# Patient Record
Sex: MALE | Race: BLACK OR AFRICAN AMERICAN | NOT HISPANIC OR LATINO | Employment: UNEMPLOYED | URBAN - METROPOLITAN AREA
[De-identification: names, ages, dates, MRNs, and addresses within clinical notes are randomized per-mention and may not be internally consistent; named-entity substitution may affect disease eponyms.]

---

## 2017-01-13 DIAGNOSIS — K21.9 GASTROESOPHAGEAL REFLUX DISEASE WITHOUT ESOPHAGITIS: Primary | ICD-10-CM

## 2017-04-05 DIAGNOSIS — M54.50 CHRONIC BILATERAL LOW BACK PAIN WITHOUT SCIATICA: ICD-10-CM

## 2017-04-05 DIAGNOSIS — G89.29 CHRONIC BILATERAL LOW BACK PAIN WITHOUT SCIATICA: ICD-10-CM

## 2017-04-05 RX ORDER — NAPROXEN 500 MG/1
500 TABLET ORAL 2 TIMES DAILY WITH MEALS
Qty: 60 TABLET | Refills: 1 | Status: SHIPPED | OUTPATIENT
Start: 2017-04-05 | End: 2017-06-12

## 2017-06-12 DIAGNOSIS — M54.50 CHRONIC BILATERAL LOW BACK PAIN WITHOUT SCIATICA: ICD-10-CM

## 2017-06-12 DIAGNOSIS — G89.29 CHRONIC BILATERAL LOW BACK PAIN WITHOUT SCIATICA: ICD-10-CM

## 2017-06-12 RX ORDER — NAPROXEN 500 MG/1
500 TABLET ORAL 2 TIMES DAILY PRN
Qty: 60 TABLET | Refills: 1 | Status: SHIPPED | OUTPATIENT
Start: 2017-06-12 | End: 2017-08-29

## 2017-07-31 DIAGNOSIS — K21.9 GASTROESOPHAGEAL REFLUX DISEASE WITHOUT ESOPHAGITIS: ICD-10-CM

## 2017-08-08 DIAGNOSIS — T63.441A BEE STING REACTION, ACCIDENTAL OR UNINTENTIONAL, INITIAL ENCOUNTER: ICD-10-CM

## 2017-08-08 RX ORDER — DIPHENHYDRAMINE HCL 25 MG
50 TABLET ORAL EVERY 6 HOURS PRN
Qty: 60 TABLET | Refills: 1 | Status: SHIPPED | OUTPATIENT
Start: 2017-08-08 | End: 2017-08-29

## 2017-08-28 DIAGNOSIS — K21.9 GASTROESOPHAGEAL REFLUX DISEASE WITHOUT ESOPHAGITIS: ICD-10-CM

## 2017-08-28 NOTE — TELEPHONE ENCOUNTER
I have denied refilling this medication.  Please contact pharmacy and patient and inform.  If wanting this medication will need an appointment to discuss.    Overdue for asthma recheck

## 2017-08-29 ENCOUNTER — OFFICE VISIT (OUTPATIENT)
Dept: FAMILY MEDICINE | Facility: CLINIC | Age: 42
End: 2017-08-29

## 2017-08-29 VITALS
OXYGEN SATURATION: 99 % | HEIGHT: 73 IN | DIASTOLIC BLOOD PRESSURE: 79 MMHG | HEART RATE: 70 BPM | BODY MASS INDEX: 41.75 KG/M2 | TEMPERATURE: 98.1 F | WEIGHT: 315 LBS | SYSTOLIC BLOOD PRESSURE: 113 MMHG

## 2017-08-29 DIAGNOSIS — G89.29 CHRONIC BILATERAL LOW BACK PAIN WITHOUT SCIATICA: ICD-10-CM

## 2017-08-29 DIAGNOSIS — M54.50 CHRONIC BILATERAL LOW BACK PAIN WITHOUT SCIATICA: ICD-10-CM

## 2017-08-29 DIAGNOSIS — K21.9 GASTROESOPHAGEAL REFLUX DISEASE WITHOUT ESOPHAGITIS: Primary | ICD-10-CM

## 2017-08-29 DIAGNOSIS — J30.2 CHRONIC SEASONAL ALLERGIC RHINITIS, UNSPECIFIED TRIGGER: ICD-10-CM

## 2017-08-29 DIAGNOSIS — M62.830 BACK MUSCLE SPASM: ICD-10-CM

## 2017-08-29 RX ORDER — DIPHENHYDRAMINE HCL 25 MG
50 TABLET ORAL
Qty: 90 TABLET | Refills: 1 | Status: SHIPPED | OUTPATIENT
Start: 2017-08-29 | End: 2020-01-06

## 2017-08-29 RX ORDER — CYCLOBENZAPRINE HCL 10 MG
5-10 TABLET ORAL 2 TIMES DAILY PRN
Qty: 30 TABLET | Refills: 0 | Status: SHIPPED | OUTPATIENT
Start: 2017-08-29 | End: 2017-10-02

## 2017-08-29 RX ORDER — NAPROXEN 500 MG/1
500 TABLET ORAL 2 TIMES DAILY PRN
Qty: 30 TABLET | Refills: 1 | Status: SHIPPED | OUTPATIENT
Start: 2017-08-29 | End: 2017-10-25

## 2017-08-29 NOTE — MR AVS SNAPSHOT
After Visit Summary   2017    Adan Rod    MRN: [id number]           Patient Information     Date Of Birth          1975        Visit Information        Provider Department      2017 4:00 PM Manuela Lindsay MD [facility]        Today's Diagnoses     Gastroesophageal reflux disease without esophagitis        Bee sting reaction, accidental or unintentional, initial encounter        Chronic bilateral low back pain without sciatica        Back muscle spasm           Follow-ups after your visit        Who to contact     Please call your clinic at [phone] to:    Ask questions about your health    Make or cancel appointments    Discuss your medicines    Learn about your test results    Speak to your doctor   If you have compliments or concerns about an experience at your clinic, or if you wish to file a complaint, please contact [facility] Patient Relations at [phone] or email us at [email or web address]         Additional Information About Your Visit        MyChart Information     [employer] is an electronic gateway that provides easy, online access to your medical records. With [employer], you can request a clinic appointment, read your test results, renew a prescription or communicate with your care team.     To sign up for [employer] visit the website at [email or web address]   You will be asked to enter the access code listed below, as well as some personal information. Please follow the directions to create your username and password.     Your access code is: [email or web address]  Expires: 2017  4:42 PM     Your access code will  in 90 days. If you need help or a new code, please contact your [facility] or call [phone] for assistance.        Care EveryWhere ID     This is your Care EveryWhere ID. This could be used by other organizations to access your [facility]  "records  [id number]        Your Vitals Were     Pulse Temperature Height Pulse Oximetry BMI (Body Mass Index)       70 98.1  F (36.7  C) (Oral) 6' 0.5\" (184.2 cm) 99% 46.98 kg/m2        Blood Pressure from Last 3 Encounters:   08/29/17 113/79   08/22/16 119/79   05/10/16 115/74    Weight from Last 3 Encounters:   08/29/17 (!) 351 lb 3.2 oz (159.3 kg)   08/22/16 (!) 357 lb 12.8 oz (162.3 kg)   05/10/16 (!) 347 lb 3.2 oz (157.5 kg)              Today, you had the following     No orders found for display         Today's Medication Changes          These changes are accurate as of: 8/29/17  4:42 PM.  If you have any questions, ask your nurse or doctor.               These medicines have changed or have updated prescriptions.        Dose/Directions    cyclobenzaprine 10 MG tablet   Commonly known as:  FLEXERIL   This may have changed:  when to take this   Used for:  Back muscle spasm        Dose:  5-10 mg   Take 0.5-1 tablets (5-10 mg) by mouth 2 times daily as needed for muscle spasms   Quantity:  30 tablet   Refills:  0       diphenhydrAMINE 25 MG tablet   Commonly known as:  BENADRYL   This may have changed:  when to take this   Used for:  Bee sting reaction, accidental or unintentional, initial encounter        Dose:  50 mg   Take 2 tablets (50 mg) by mouth nightly as needed for itching or allergies   Quantity:  90 tablet   Refills:  1       ranitidine 150 MG tablet   Commonly known as:  ZANTAC   This may have changed:  medication strength   Used for:  Gastroesophageal reflux disease without esophagitis        Dose:  150 mg   Take 1 tablet (150 mg) by mouth 2 times daily   Quantity:  120 tablet   Refills:  5            Where to get your medicines      These medications were sent to [facility]/pharmacy #[id number] - [place], [place] - [address]  [address], [place] [address]    Hours:  24-hours Phone:  [phone]     cyclobenzaprine 10 MG tablet    diphenhydrAMINE 25 MG tablet    naproxen 500 MG tablet "    ranitidine 150 MG tablet                Primary Care Provider Office Phone # Fax #    Christie Mary Durant [phone]       [facility] [address] [place] [address]        Equal Access to Services     RONALD HAYWARD : Hadii aad ku haddorothyo Soomaali, waaxda luqadaha, qaybta kaalmada adeegyada, waxdaniel waldenin sheltonn alfredo bustos lamarisolviridiana granados. So [facility] [phone].    ATENCIÓN: Si habla español, tiene a johnson disposición servicios gratuitos de asistencia lingüística. Ll[facility] [phone].    We comply with applicable federal civil rights laws and [place] laws. We do not discriminate on the basis of race, color, national origin, age, disability sex, sexual orientation or gender identity.            Thank you!     Thank you for choosing [facility]  for your care. Our goal is always to provide you with excellent care. Hearing back from our patients is one way we can continue to improve our services. Please take a few minutes to complete the written survey that you may receive in the mail after your visit with us. Thank you!             Your Updated Medication List - Protect others around you: Learn how to safely use, store and throw away your medicines at www.disposemymeds.org.          This list is accurate as of: 8/29/17  4:42 PM.  Always use your most recent med list.                   Brand Name Dispense Instructions for use Diagnosis    albuterol 108 (90 BASE) MCG/ACT Inhaler    PROAIR HFA    1 Inhaler    Inhale 2 puffs into the lungs every 4 hours as needed for shortness of breath / dyspnea 15 minutes prior to exercise.    Exercise-induced asthma       cyclobenzaprine 10 MG tablet    FLEXERIL    30 tablet    Take 0.5-1 tablets (5-10 mg) by mouth 2 times daily as needed for muscle spasms    Back muscle spasm       diphenhydrAMINE 25 MG tablet    BENADRYL    90 tablet    Take 2 tablets (50 mg) by mouth nightly as needed for itching or allergies    Bee sting reaction,  accidental or unintentional, initial encounter       EPINEPHrine 0.3 MG/0.3ML injection 2-pack    EPIPEN/ADRENACLICK/or ANY BX GENERIC EQUIV    0.6 mL    Inject 0.3 mLs (0.3 mg) into the muscle as needed for anaphylaxis    Bee sting reaction, accidental or unintentional, initial encounter       naproxen 500 MG tablet    NAPROSYN    30 tablet    Take 1 tablet (500 mg) by mouth 2 times daily as needed for moderate pain    Chronic bilateral low back pain without sciatica       ranitidine 150 MG tablet    ZANTAC    120 tablet    Take 1 tablet (150 mg) by mouth 2 times daily    Gastroesophageal reflux disease without esophagitis

## 2017-08-31 NOTE — PROGRESS NOTES
"       HPI:       Adan Rod is a 42 year old male with a hx of BITA, anxiety, depression, chronic pain, morbid obesity and exercise-induced asthma who presents for the following:    Medication refills:  -pt reports back and knee pain - using ice/heat - requests refills on flexeril (uses 4x/month when pain is severe with cramping) and naproxen (uses 3 times per week)  -requests refill of benadryl for seasonal allergy symptoms - uses 25mg QHS PRN  -requests refill of zantac 150mg BID for acid reflux and hx peptic ulcer    Anxiety, depression:  -reports mainly having issues with anxiety and anger  -states is in \"denial\" about depression  -reports being on meds in the past (including seroquel) that made him drowsy         PMHX:     Patient Active Problem List   Diagnosis     Sleep apnea     Anxiety state     Cataract     Major depression, chronic     Other chronic pain     Contact dermatitis and other eczema, due to unspecified cause     Generalized hyperhidrosis     Exercise-induced asthma     Morbid obesity (H)     Peptic ulcer     Scoliosis (and kyphoscoliosis), idiopathic     Vitamin D deficiency     Pain, flank, bilateral     Chronic low back pain     Health Care Home     Knee pain     Allergic to bees     Hypercholesterolemia     Raised intraocular pressure     Thoracic and lumbosacral neuritis     Osteoarthritis of lumbosacral spine without myelopathy     Primary open angle glaucoma of both eyes, mild stage     Deprivation amblyopia of both eyes     Current Outpatient Prescriptions   Medication Sig Dispense Refill     ranitidine (ZANTAC) 150 MG tablet Take 1 tablet (150 mg) by mouth 2 times daily 120 tablet 5     diphenhydrAMINE (BENADRYL) 25 MG tablet Take 2 tablets (50 mg) by mouth nightly as needed for itching or allergies 90 tablet 1     naproxen (NAPROSYN) 500 MG tablet Take 1 tablet (500 mg) by mouth 2 times daily as needed for moderate pain 30 tablet 1     cyclobenzaprine (FLEXERIL) 10 MG tablet Take " "0.5-1 tablets (5-10 mg) by mouth 2 times daily as needed for muscle spasms 30 tablet 0     EPINEPHrine (EPIPEN) 0.3 MG/0.3ML injection Inject 0.3 mLs (0.3 mg) into the muscle as needed for anaphylaxis 0.6 mL 3     albuterol (ALBUTEROL) 108 (90 BASE) MCG/ACT inhaler Inhale 2 puffs into the lungs every 4 hours as needed for shortness of breath / dyspnea 15 minutes prior to exercise. 1 Inhaler 3      Allergies   Allergen Reactions     Penicillins Difficulty breathing     And hives     Bee Venom      Ibuprofen Sodium      Prednisone           Review of Systems:   ROS negative except as noted above          Physical Exam:     Vitals:    08/29/17 1616   BP: 113/79   Pulse: 70   Temp: 98.1  F (36.7  C)   TempSrc: Oral   SpO2: 99%   Weight: (!) 351 lb 3.2 oz (159.3 kg)   Height: 6' 0.5\" (184.2 cm)     Body mass index is 46.98 kg/(m^2).    General appearance: alert, NAD  HEENT: atraumatic, normocephalic, no scleral icterus or injection, moist mucous membranes  Neck: normal ROM  CV: RRR, no murmurs/rubs/gallops, normal S1 and S2  Lungs: CTAB, no wheezes or crackles, breathing comfortably on room air  Neuro: alert, oriented x3, CNs grossly intact, no focal deficits appreciated, gait normal  Psych: normal mood/affect/behavior, answering questions appropriately, linear thought process    Assessment and Plan     Gastroesophageal reflux disease without esophagitis: will refill zantac for reflux control especially in the setting of hx peptic ulcer.  - ranitidine (ZANTAC) 150 MG tablet; Take 1 tablet (150 mg) by mouth 2 times daily  Dispense: 120 tablet; Refill: 5    Allergies: reports benadryl has been helpful in controlling his seasonal allergy symptoms in the past. States he has tried other meds in the past but feels that benadryl has been the most effective with least amount of side effects.  - diphenhydrAMINE (BENADRYL) 25 MG tablet; Take 2 tablets (50 mg) by mouth nightly as needed for itching or allergies  Dispense: 90 " tablet; Refill: 1    Chronic bilateral low back pain without sciatica with spasm: given pt's limited use of these medications, feel comfortable providing refills today.   - naproxen (NAPROSYN) 500 MG tablet; Take 1 tablet (500 mg) by mouth 2 times daily as needed for moderate pain  Dispense: 30 tablet; Refill: 1  - cyclobenzaprine (FLEXERIL) 10 MG tablet; Take 0.5-1 tablets (5-10 mg) by mouth 2 times daily as needed for muscle spasms  Dispense: 30 tablet; Refill: 0    Options for treatment and follow-up care were reviewed with the patient and/or guardian. Adan Rod and/or guardian engaged in the decision making process and verbalized understanding of the options discussed and agreed with the final plan.    Manuela Lindsay MD      Precepted today with: Devin Agudelo MD

## 2017-09-01 NOTE — PROGRESS NOTES
Preceptor Attestation:  Patient's case reviewed and discussed with Manuela Lindsay MD resident and I evaluated the patient. I agree with written assessment and plan of care.  Supervising Physician:Devin Agudelo MD    [facility]

## 2017-10-02 DIAGNOSIS — M62.830 BACK MUSCLE SPASM: ICD-10-CM

## 2017-10-02 RX ORDER — CYCLOBENZAPRINE HCL 10 MG
5-10 TABLET ORAL 2 TIMES DAILY PRN
Qty: 30 TABLET | Refills: 0 | Status: SHIPPED | OUTPATIENT
Start: 2017-10-02 | End: 2017-10-25

## 2017-10-25 DIAGNOSIS — M62.830 BACK MUSCLE SPASM: ICD-10-CM

## 2017-10-25 DIAGNOSIS — M54.50 CHRONIC BILATERAL LOW BACK PAIN WITHOUT SCIATICA: ICD-10-CM

## 2017-10-25 DIAGNOSIS — G89.29 CHRONIC BILATERAL LOW BACK PAIN WITHOUT SCIATICA: ICD-10-CM

## 2017-10-26 RX ORDER — CYCLOBENZAPRINE HCL 10 MG
5-10 TABLET ORAL 2 TIMES DAILY PRN
Qty: 30 TABLET | Refills: 0 | Status: SHIPPED | OUTPATIENT
Start: 2017-10-26 | End: 2018-12-12

## 2017-10-26 RX ORDER — NAPROXEN 500 MG/1
500 TABLET ORAL 2 TIMES DAILY PRN
Qty: 30 TABLET | Refills: 0 | Status: SHIPPED | OUTPATIENT
Start: 2017-10-26 | End: 2018-12-12

## 2017-12-19 DIAGNOSIS — J45.990 EXERCISE-INDUCED ASTHMA: ICD-10-CM

## 2017-12-19 RX ORDER — ALBUTEROL SULFATE 90 UG/1
2 AEROSOL, METERED RESPIRATORY (INHALATION) EVERY 4 HOURS PRN
Qty: 1 INHALER | Refills: 11 | Status: SHIPPED | OUTPATIENT
Start: 2017-12-19 | End: 2018-07-17

## 2017-12-19 RX ORDER — ALBUTEROL SULFATE 90 UG/1
2 AEROSOL, METERED RESPIRATORY (INHALATION) EVERY 4 HOURS PRN
Qty: 1 INHALER | Refills: 11 | Status: SHIPPED | OUTPATIENT
Start: 2017-12-19 | End: 2017-12-19

## 2018-05-17 ENCOUNTER — TELEPHONE (OUTPATIENT)
Dept: FAMILY MEDICINE | Facility: CLINIC | Age: 43
End: 2018-05-17

## 2018-07-17 ENCOUNTER — TELEPHONE (OUTPATIENT)
Dept: FAMILY MEDICINE | Facility: CLINIC | Age: 43
End: 2018-07-17

## 2018-07-17 DIAGNOSIS — J45.990 EXERCISE-INDUCED ASTHMA: ICD-10-CM

## 2018-07-17 RX ORDER — ALBUTEROL SULFATE 90 UG/1
2 AEROSOL, METERED RESPIRATORY (INHALATION) EVERY 4 HOURS PRN
Qty: 1 INHALER | Refills: 3 | Status: SHIPPED | OUTPATIENT
Start: 2018-07-17 | End: 2019-08-21

## 2018-07-17 NOTE — TELEPHONE ENCOUNTER
Refill of albuterol filled and electronically sent to his pharmacy listed. If he has increasing shortness of breath, chest tightness, fevers/chills, or is not improving he should come into clinic to be seen.    Christie Durant MD (PGY3)  Pager: [phone]  [facility] Resident

## 2018-07-17 NOTE — TELEPHONE ENCOUNTER
Called patient and updated to be seen in clinic if symptoms worsen, don't resolve, or has chest tightness/pain, fevers/chills. Patient verbalized understanding.Otilia REINA

## 2018-07-17 NOTE — TELEPHONE ENCOUNTER
[facility] phone call message- medication clarification/question:    Full Medication Name:albuterol (PROAIR HFA) 108 (90 BASE) MCG/ACT Inhaler     Question: Pt states he needs refill on inhaler. States he believes that may be the reason he is having some difficulty breathing at times. Would like sent today if possible and a call back to know if it was sent.      Pharmacy confirmed as    [id number] - [place], [place] - [address]: Yes    OK to leave a message on voice mail? Yes    Primary language: English      needed? No    Call taken on July 17, 2018 at 10:16 AM by Sola Mcnulty

## 2018-07-18 ENCOUNTER — OFFICE VISIT (OUTPATIENT)
Dept: FAMILY MEDICINE | Facility: CLINIC | Age: 43
End: 2018-07-18
Payer: MEDICARE

## 2018-07-18 ENCOUNTER — RECORDS - HEALTHEAST (OUTPATIENT)
Dept: ADMINISTRATIVE | Facility: OTHER | Age: 43
End: 2018-07-18

## 2018-07-18 VITALS
TEMPERATURE: 97.9 F | DIASTOLIC BLOOD PRESSURE: 76 MMHG | WEIGHT: 315 LBS | BODY MASS INDEX: 45.75 KG/M2 | SYSTOLIC BLOOD PRESSURE: 113 MMHG | OXYGEN SATURATION: 98 % | HEART RATE: 59 BPM

## 2018-07-18 DIAGNOSIS — F41.1 ANXIETY STATE: ICD-10-CM

## 2018-07-18 DIAGNOSIS — R06.02 SOB (SHORTNESS OF BREATH): ICD-10-CM

## 2018-07-18 DIAGNOSIS — R07.89 DISCOMFORT IN CHEST: ICD-10-CM

## 2018-07-18 DIAGNOSIS — J45.990 EXERCISE-INDUCED ASTHMA: Primary | ICD-10-CM

## 2018-07-18 RX ORDER — BRIMONIDINE TARTRATE 2 MG/ML
1 SOLUTION/ DROPS OPHTHALMIC
COMMUNITY
Start: 2017-01-20

## 2018-07-18 RX ORDER — NITROGLYCERIN 400 UG/1
SPRAY ORAL
Qty: 4.9 G | Refills: 1 | Status: SHIPPED | OUTPATIENT
Start: 2018-07-18 | End: 2018-07-19

## 2018-07-18 NOTE — PROGRESS NOTES
Assessment and Plan     1. Exercise-induced asthma  3. SOB (shortness of breath)  Will need spirometry at next visit. Patient scored 14 on ACT - could benefit from inhaled corticosteroid. Possible symptoms are cardiac in origin - less likely given negative CXR and EKG as below but will have patient complete stress test to rule this out.   - Stress test scheduled   - Asthma Action Plan (AAP) done this visit - will continue with albuterol only but may need steroid next visit  - XR CHEST 2 VW - no acute process    2. Discomfort in chest  Symptoms seem consistent with anginal pain. Discussed not over exertion until gets stress test scheduled and cardiac work up negative. Did provide SL Nitro if symptoms persist after using albuterol. No Hx of DM, smoking. Cannot calculate ASCVD as no recent lipids on file. Did discuss what symptoms warrant emergency room visit.   - EKG 12-lead complete w/read - Clinics - no acute change per my read, see interpretation below  - XR CHEST 2 VW  - ASA 81mg  - SL Nitroglycerin prescribed and instructions discussed      Options for treatment and follow-up care were reviewed with the patient and/or guardian. Adan Rod and/or guardian engaged in the decision making process and verbalized understanding of the options discussed and agreed with the final plan.    Praful Ahn MD   [facility]  Pager #: [phone]    Precepted today with: Dr. Westfall           HPI:       Adan Rod is a 43 year old male with PMH of asthma who presents for:    SOB/chest discomfort:  Happens with exercise - walking up stairs, lifting heavy  Started two months ago  Does not know if it feels like his asthma   This is a big change for him - previously able to walk without difficulty  Does get diaphoretic with episodes  Is now happening every day - getting progressively worse  Substernal pain - localized  No numbness or radiating pain  Cannot give pain a number but does not feel  "stabbing \"its just hard to catch breath\"  Pain goes away after 3-4 minutes  Does take his inhaler which helps symptoms  Cannot tell if the weather is a factor  Not currently having the pain    Did have stress test in 2008, unsure why he had this done but it was normal.     Does smoke THC for glaucoma - does not seem to exacerbate symptoms.     Father had heart attack at 70, no other early cardiac history in family.            PMHX:     Patient Active Problem List   Diagnosis     Sleep apnea     Anxiety state     Cataract     Major depression, chronic     Other chronic pain     Contact dermatitis and other eczema, due to unspecified cause     Generalized hyperhidrosis     Exercise-induced asthma     Morbid obesity (H)     Peptic ulcer     Scoliosis (and kyphoscoliosis), idiopathic     Vitamin D deficiency     Pain, flank, bilateral     Chronic low back pain     Health Care Home     Knee pain     Allergic to bees     Hypercholesterolemia     Raised intraocular pressure     Thoracic and lumbosacral neuritis     Osteoarthritis of lumbosacral spine without myelopathy     Primary open angle glaucoma of both eyes, mild stage     Deprivation amblyopia of both eyes       Current Outpatient Prescriptions   Medication Sig Dispense Refill     albuterol (PROAIR HFA) 108 (90 Base) MCG/ACT Inhaler Inhale 2 puffs into the lungs every 4 hours as needed for shortness of breath / dyspnea 15 minutes prior to exercise. 1 Inhaler 3     cyclobenzaprine (FLEXERIL) 10 MG tablet Take 0.5-1 tablets (5-10 mg) by mouth 2 times daily as needed for muscle spasms 30 tablet 0     diphenhydrAMINE (BENADRYL) 25 MG tablet Take 2 tablets (50 mg) by mouth nightly as needed for itching or allergies 90 tablet 1     EPINEPHrine (EPIPEN) 0.3 MG/0.3ML injection Inject 0.3 mLs (0.3 mg) into the muscle as needed for anaphylaxis 0.6 mL 3     naproxen (NAPROSYN) 500 MG tablet Take 1 tablet (500 mg) by mouth 2 times daily as needed for moderate pain 30 tablet 0 "     ranitidine (ZANTAC) 150 MG tablet Take 1 tablet (150 mg) by mouth 2 times daily 120 tablet 5       Social History     Social History     Marital status: Single     Spouse name: N/A     Number of children: N/A     Years of education: N/A     Occupational History     Not on file.     Social History Main Topics     Smoking status: Former Smoker     Smokeless tobacco: Never Used     Alcohol use No     Drug use: No     Sexual activity: Not on file     Other Topics Concern     Not on file     Social History Narrative          Allergies   Allergen Reactions     Penicillins Difficulty breathing     And hives     Bee Venom      Ibuprofen Sodium      Percocet [Oxycodone-Acetaminophen]        No results found for this or any previous visit (from the past 24 hour(s)).         Review of Systems:   C: NEGATIVE for fatigue, unexpected change in weight  E: NEGATIVE for acute vision problems or changes  R: NEGATIVE for significant cough or shortness of breath  CV: POSITIVE for chest pain, No palpitations or new or worsening peripheral edema  P: NEGATIVE for changes in mood or affect    Complete ROS negative unless noted above or in HPI          Physical Exam:     Vitals:    07/18/18 0855   BP: 113/76   Pulse: 59   Temp: 97.9  F (36.6  C)   TempSrc: Oral   SpO2: 98%   Weight: (!) 342 lb (155.1 kg)     Body mass index is 45.75 kg/(m^2).    GENERAL APPEARANCE: alert and no acute distress, obese male  PSYCH: mentation appears normal and affect normal/bright  EYES: EOMI, no scleral icterus  RESP: lungs clear to auscultation - no rales, rhonchi or wheezes  CV: regular rate and rhythm, normal S1 S2, no S3 or S4 and no murmur, click or rub -  CHEST: unable to reproduce pain with palpation  ABDOMEN:  soft, nontender, no HSM or masses and bowel sounds normal. No abdominal bruits.  EXT: no cyanosis or edema in lower extremities  SKIN: no venous stasis changes  NEURO: Normal strength and tone, sensory exam grossly normal, mentation intact  and speech normal         EKG Interpretation:      Interpreted by Praful Ahn  Time reviewed: 0931   Symptoms at time of EKG: None   Rhythm: Normal sinus   Rate: Normal  Axis: Normal  Ectopy: None  Conduction: Normal  ST Segments/ T Waves: No ST-T wave changes and No acute ischemic changes  Q Waves: None  Comparison to prior: No old EKG available    Clinical Impression: normal EKG

## 2018-07-18 NOTE — PATIENT INSTRUCTIONS
My Asthma Action Plan  Name: Adan Rod  YOB: 1975  Date: 7/18/2018   My doctor: Christie Durant   My clinic:   [facility] [address] [place] [address]  [phone]    My Asthma Severity: intermittent Avoid your asthma triggers: exercise or sports      GREEN ZONE   Good Control    I feel good    No cough or wheeze    Can work, sleep and play without asthma symptoms       Take your asthma control medicine every day.  Take the medications listed below daily.    Albuterol as needed - we may need to add a daily inhaled steroid     1. If exercise triggers your asthma, take your rescue medication (2 puffs of albuterol, Ventolin/Pro-Air) 15 minutes before exercise or sports, and during exercise if you have asthma symptoms.  2. Spacer to use with inhaler: If you have a spacer, make sure to use it with your inhaler.              YELLOW ZONE Getting Worse  I have ANY of these:    I do not feel good    Cough or wheeze    Chest feels tight    Wake up at night   1. Keep taking your Green Zone medications.  2. Start taking your rescue medicine (1-2 puffs of albuterol - Ventolin/Pro-Air) every 4-6 hours as needed.  3. If symptoms are not controlled with above, can take 2 puffs every 20 minutes for up to 1 hour, then continue every 4 hours if needed.   4. If you do not return to the Green Zone in 12-24 hours or you get worse, call the clinic.         RED ZONE Medical Alert - Get Help  I have ANY of these:    I feel awful    Medicine is not helping    Breathing getting harder    Trouble walking or talking    Nose opens wide to breathe       1. Take your rescue medicine NOW (6-8 puffs of albuterol - Ventolin/Pro-Air) for every 20 minutes for up to 1 hour.  2. If your provider has prescribed an oral steroid medicine (Prednisone 20 mg), start taking it NOW.  3. Call your doctor NOW.  4. If you are still in the Red Zone after 20 minutes and you have not reached your doctor:    Take  your rescue medicine again (6-8 puffs of albuterol - Ventolin/Pro-Air) and    Call 911 or go to the emergency room right away    See your regular doctor within 1 weeks of an Emergency Room or Urgent Care visit for follow-up treatment.        This Asthma Action Plan provides authorization for the administration of medication described in the AAP.  YES      Electronically signed by: Praful Ahn    Annual Reminders:  Meet with Asthma Educator,  Flu Shot in the Fall, Pneumonia Shot  Pharmacy:    [id number] - [place], [place] - [address]  [id number] [place], [place] - [address] [facility]      Referral for ( TEST )  :      Nuclear Med with Lexiscan  LOCATION/PLACE/Provider :    [facility]   DATE & TIME :     8-1-2018 at 9:15am and 8-2-2018 at 7:00am  PHONE :     [phone]  FAX :     [phone]  Appointment made by clinic staff/:    Jade

## 2018-07-18 NOTE — NURSING NOTE
Chief Complaint   Patient presents with     Asthma     check up. Has been having more difficulties with breathing for the past 2 months. Currently only taking Albuterol  as needed.      Medication Reconciliation     completed.        /76  Pulse 59  Temp 97.9  F (36.6  C) (Oral)  Wt (!) 342 lb (155.1 kg)  SpO2 98%  BMI 45.75 kg/m2

## 2018-07-18 NOTE — MR AVS SNAPSHOT
After Visit Summary   7/18/2018    Adan Rod    MRN: [id number]           Patient Information     Date Of Birth          1975        Visit Information        Provider Department      7/18/2018 9:00 AM Praful Ahn MD [facility]        Today's Diagnoses     Exercise-induced asthma    -  1    Discomfort in chest        SOB (shortness of breath)        Anxiety state          Care Instructions      My Asthma Action Plan  Name: Adan Rod  YOB: 1975  Date: 7/18/2018   My doctor: Christie Durant   My clinic:   [facility] [address] [place] [address]  [phone]    My Asthma Severity: intermittent Avoid your asthma triggers: exercise or sports      GREEN ZONE   Good Control    I feel good    No cough or wheeze    Can work, sleep and play without asthma symptoms       Take your asthma control medicine every day.  Take the medications listed below daily.    Albuterol as needed - we may need to add a daily inhaled steroid     1. If exercise triggers your asthma, take your rescue medication (2 puffs of albuterol, Ventolin/Pro-Air) 15 minutes before exercise or sports, and during exercise if you have asthma symptoms.  2. Spacer to use with inhaler: If you have a spacer, make sure to use it with your inhaler.              YELLOW ZONE Getting Worse  I have ANY of these:    I do not feel good    Cough or wheeze    Chest feels tight    Wake up at night   1. Keep taking your Green Zone medications.  2. Start taking your rescue medicine (1-2 puffs of albuterol - Ventolin/Pro-Air) every 4-6 hours as needed.  3. If symptoms are not controlled with above, can take 2 puffs every 20 minutes for up to 1 hour, then continue every 4 hours if needed.   4. If you do not return to the Green Zone in 12-24 hours or you get worse, call the clinic.         RED ZONE Medical Alert - Get Help  I have ANY of these:    I feel awful    Medicine is not  helping    Breathing getting harder    Trouble walking or talking    Nose opens wide to breathe       1. Take your rescue medicine NOW (6-8 puffs of albuterol - Ventolin/Pro-Air) for every 20 minutes for up to 1 hour.  2. If your provider has prescribed an oral steroid medicine (Prednisone 20 mg), start taking it NOW.  3. Call your doctor NOW.  4. If you are still in the Red Zone after 20 minutes and you have not reached your doctor:    Take your rescue medicine again (6-8 puffs of albuterol - Ventolin/Pro-Air) and    Call 911 or go to the emergency room right away    See your regular doctor within 1 weeks of an Emergency Room or Urgent Care visit for follow-up treatment.        This Asthma Action Plan provides authorization for the administration of medication described in the AAP.  YES      Electronically signed by: Praful Ahn    Annual Reminders:  Meet with Asthma Educator,  Flu Shot in the Fall, Pneumonia Shot  Pharmacy:    [id number] - [place], [place] - [address]   [id number] [address] [facility]      Referral for ( TEST )  :      Nuclear Med with Lexiscan  LOCATION/PLACE/Provider :    [facility]   DATE & TIME :     8-1-2018 at 9:15am and 8-2-2018 at 7:00am  PHONE :     [phone]  FAX :     [phone]  Appointment made by clinic staff/:    Jade            Follow-ups after your visit        Who to contact     Please call your clinic at [phone] to:    Ask questions about your health    Make or cancel appointments    Discuss your medicines    Learn about your test results    Speak to your doctor            Additional Information About Your Visit        [employer] Information     [employer] is an electronic gateway that provides easy, online access to your medical records. With [employer], you can request a clinic appointment, read your test results, renew a prescription or communicate with your care team.     To sign up for [employer] visit the  website at [email or web address]   You will be asked to enter the access code listed below, as well as some personal information. Please follow the directions to create your username and password.     Your access code is: [email or web address]  Expires: 10/23/2018  9:14 AM     Your access code will  in 90 days. If you need help or a new code, please contact your [facility] or call [phone] for assistance.        Care EveryWhere ID     This is your Care EveryWhere ID. This could be used by other organizations to access your [facility] medical records  [id number]        Your Vitals Were     Pulse Temperature Pulse Oximetry BMI (Body Mass Index)          59 97.9  F (36.6  C) (Oral) 98% 45.75 kg/m2         Blood Pressure from Last 3 Encounters:   18 113/76   17 113/79   16 119/79    Weight from Last 3 Encounters:   18 (!) 342 lb (155.1 kg)   17 (!) 351 lb 3.2 oz (159.3 kg)   16 (!) 357 lb 12.8 oz (162.3 kg)              We Performed the Following     Asthma Action Plan (AAP)     EKG 12-lead complete w/read - Clinics     Nuclear Med with Lexiscan (Stress Test)     XR CHEST 2 VW          Today's Medication Changes          These changes are accurate as of 18 11:59 PM.  If you have any questions, ask your nurse or doctor.               Start taking these medicines.        Dose/Directions    nitroGLYcerin 0.4 MG/SPRAY spray   Commonly known as:  NITROLINGUAL   Used for:  Discomfort in chest   Started by:  Praful Ahn MD        For chest pain spray 1 spray under tongue every 5 minutes for 3 doses. If symptoms persist 5 minutes after 1st dose call 911.   Quantity:  4.9 g   Refills:  1            Where to get your medicines      These medications were sent to [id number] - [place], [place] - [address] [facility] [facility]  [address], [place] [address]     Phone:  [phone]     nitroGLYcerin 0.4 MG/SPRAY spray                Primary  Care Provider Office Phone # Fax #    Christie Durant [phone]       [facility] [address] [place] [address]        Equal Access to Services     RONALD HAYWARD : Hadii nancy ku haddorothyo Soomaali, waaxda luqadaha, qaybta kaalmada adeegyada, zoya cazaresmireille granados. So [facility] [phone].    ATENCIÓN: Si habla español, tiene a johnson disposición servicios gratuitos de asistencia lingüística. Llame al [phone].    We comply with applicable federal civil rights laws and [place] laws. We do not discriminate on the basis of race, color, national origin, age, disability, sex, sexual orientation, or gender identity.            Thank you!     Thank you for choosing [facility]  for your care. Our goal is always to provide you with excellent care. Hearing back from our patients is one way we can continue to improve our services. Please take a few minutes to complete the written survey that you may receive in the mail after your visit with us. Thank you!             Your Updated Medication List - Protect others around you: Learn how to safely use, store and throw away your medicines at www.disposemymeds.org.          This list is accurate as of 7/18/18 11:59 PM.  Always use your most recent med list.                   Brand Name Dispense Instructions for use Diagnosis    albuterol 108 (90 Base) MCG/ACT Inhaler    PROAIR HFA    1 Inhaler    Inhale 2 puffs into the lungs every 4 hours as needed for shortness of breath / dyspnea 15 minutes prior to exercise.    Exercise-induced asthma       brimonidine 0.2 % ophthalmic solution    ALPHAGAN     1 drop        cyclobenzaprine 10 MG tablet    FLEXERIL    30 tablet    Take 0.5-1 tablets (5-10 mg) by mouth 2 times daily as needed for muscle spasms    Back muscle spasm       diphenhydrAMINE 25 MG tablet    BENADRYL    90 tablet    Take 2 tablets (50 mg) by mouth nightly as needed for itching or allergies    Chronic  seasonal allergic rhinitis, unspecified trigger       EPINEPHrine 0.3 MG/0.3ML injection 2-pack    EPIPEN/ADRENACLICK/or ANY BX GENERIC EQUIV    0.6 mL    Inject 0.3 mLs (0.3 mg) into the muscle as needed for anaphylaxis    Bee sting reaction, accidental or unintentional, initial encounter       naproxen 500 MG tablet    NAPROSYN    30 tablet    Take 1 tablet (500 mg) by mouth 2 times daily as needed for moderate pain    Chronic bilateral low back pain without sciatica       nitroGLYcerin 0.4 MG/SPRAY spray    NITROLINGUAL    4.9 g    For chest pain spray 1 spray under tongue every 5 minutes for 3 doses. If symptoms persist 5 minutes after 1st dose call 911.    Discomfort in chest       ranitidine 150 MG tablet    ZANTAC    120 tablet    Take 1 tablet (150 mg) by mouth 2 times daily    Gastroesophageal reflux disease without esophagitis

## 2018-07-19 ENCOUNTER — TELEPHONE (OUTPATIENT)
Dept: FAMILY MEDICINE | Facility: CLINIC | Age: 43
End: 2018-07-19

## 2018-07-19 DIAGNOSIS — R07.89 DISCOMFORT IN CHEST: Primary | ICD-10-CM

## 2018-07-19 RX ORDER — NITROGLYCERIN 0.4 MG/1
TABLET SUBLINGUAL
Qty: 25 TABLET | Refills: 0 | Status: SHIPPED | OUTPATIENT
Start: 2018-07-19 | End: 2018-12-12

## 2018-07-19 ASSESSMENT — ASTHMA QUESTIONNAIRES: ACT_TOTALSCORE: 14

## 2018-07-19 NOTE — TELEPHONE ENCOUNTER
[facility] phone call message- medication clarification/question:    Full Medication Name: nitroGLYcerin (NITROLINGUAL) 0.4 MG/SPRAY spray    Question: Pt called in stating insurance won't cover this medication and needs a prior authorization unless doctor has an alternative they would like to send instead. Pt would like a call back to let him know what is going to be done.     Pharmacy confirmed as      [id number] - [place], [place] - [address]: Yes    OK to leave a message on voice mail? Yes    Primary language: English      needed? No    Call taken on July 19, 2018 at 10:03 AM by Sola Mcnulty

## 2018-07-24 NOTE — TELEPHONE ENCOUNTER
Patient is calling regarding his spray he states he called a couple days ago and is not sure about what is going on with the Rx. Told him it is in prior auth process, but he said he has no idea. Patient also mention about aspirin. Told him it could take up to two business days for a response. Please call and advise.

## 2018-07-25 NOTE — PROGRESS NOTES
Preceptor Attestation:   Patient seen, evaluated and discussed with the resident. I personally viewed the EKG and agree with the interpretation documented by the resident.  I personally reviewed the CXR and agree with residents interpretation as documented.  I have verified the content of the note, which accurately reflects my assessment of the patient and the plan of care.  Supervising Physician:Arian Westfall MD, MD  [facility]

## 2018-08-01 ENCOUNTER — HOSPITAL ENCOUNTER (OUTPATIENT)
Dept: CARDIOLOGY | Facility: HOSPITAL | Age: 43
Discharge: HOME | End: 2018-08-01
Attending: FAMILY MEDICINE

## 2018-08-01 DIAGNOSIS — R06.02 SOB (SHORTNESS OF BREATH): ICD-10-CM

## 2018-08-01 DIAGNOSIS — R07.89 DISCOMFORT IN CHEST: ICD-10-CM

## 2018-08-14 DIAGNOSIS — G89.29 CHRONIC BILATERAL LOW BACK PAIN WITHOUT SCIATICA: ICD-10-CM

## 2018-08-14 DIAGNOSIS — M54.50 CHRONIC BILATERAL LOW BACK PAIN WITHOUT SCIATICA: ICD-10-CM

## 2018-08-15 RX ORDER — NAPROXEN 500 MG/1
500 TABLET ORAL 2 TIMES DAILY PRN
Qty: 30 TABLET | OUTPATIENT
Start: 2018-08-15

## 2018-11-26 ENCOUNTER — TELEPHONE (OUTPATIENT)
Dept: FAMILY MEDICINE | Facility: CLINIC | Age: 43
End: 2018-11-26

## 2018-11-26 NOTE — TELEPHONE ENCOUNTER
"Called patient and went over new ACT. Patient also had a concern from previous issue with side pain and SOB when moving around a lot. He stated Dr Ahn was suppose to get back to him or [facility] because he didn't want to receive the contrast injection they do during the test and they said they would get back to him and he has never heard back from anyone and is still having symptoms.     He wants to discuss other options then with Dr Durant as the contrast injections \"put him down' and he does not want that. Made appointment with dr Durant on 11/30/18 at 11am.      "

## 2018-11-27 ASSESSMENT — ASTHMA QUESTIONNAIRES: ACT_TOTALSCORE: 21

## 2018-12-11 DIAGNOSIS — K21.9 GASTROESOPHAGEAL REFLUX DISEASE WITHOUT ESOPHAGITIS: ICD-10-CM

## 2018-12-12 ENCOUNTER — OFFICE VISIT (OUTPATIENT)
Dept: FAMILY MEDICINE | Facility: CLINIC | Age: 43
End: 2018-12-12
Payer: MEDICARE

## 2018-12-12 VITALS
HEART RATE: 76 BPM | RESPIRATION RATE: 22 BRPM | WEIGHT: 315 LBS | TEMPERATURE: 97.9 F | BODY MASS INDEX: 40.43 KG/M2 | SYSTOLIC BLOOD PRESSURE: 128 MMHG | OXYGEN SATURATION: 96 % | HEIGHT: 74 IN | DIASTOLIC BLOOD PRESSURE: 85 MMHG

## 2018-12-12 DIAGNOSIS — F41.1 ANXIETY STATE: ICD-10-CM

## 2018-12-12 DIAGNOSIS — M54.50 CHRONIC BILATERAL LOW BACK PAIN WITHOUT SCIATICA: ICD-10-CM

## 2018-12-12 DIAGNOSIS — K21.9 GASTROESOPHAGEAL REFLUX DISEASE WITHOUT ESOPHAGITIS: ICD-10-CM

## 2018-12-12 DIAGNOSIS — F32.9 MAJOR DEPRESSION, CHRONIC: Primary | ICD-10-CM

## 2018-12-12 DIAGNOSIS — G89.29 CHRONIC BILATERAL LOW BACK PAIN WITHOUT SCIATICA: ICD-10-CM

## 2018-12-12 RX ORDER — NAPROXEN 500 MG/1
500 TABLET ORAL 2 TIMES DAILY PRN
Qty: 90 TABLET | Refills: 1 | Status: SHIPPED | OUTPATIENT
Start: 2018-12-12 | End: 2019-12-27

## 2018-12-12 RX ORDER — HYDROXYZINE HYDROCHLORIDE 25 MG/1
25-50 TABLET, FILM COATED ORAL EVERY 6 HOURS PRN
Qty: 60 TABLET | Refills: 1 | Status: SHIPPED | OUTPATIENT
Start: 2018-12-12 | End: 2018-12-22

## 2018-12-12 ASSESSMENT — MIFFLIN-ST. JEOR: SCORE: 2545.97

## 2018-12-12 NOTE — PROGRESS NOTES
"       HPI       Adan Rod is a 43 year old male who presents for:  Chief Complaint   Patient presents with     RECHECK     side pain, SOB       Acid reflux  - is taking ranitidine 150mg bid which is helpful and alleviates his symptoms  - he is out of the medication and would like a refill  - no melena or hematemesis  - has hx of PUD after taking a very large amount of ibupfrofen    Low energy  - has been treated in the past, no 2-3 different medications  - one made him tired (slept all the time on venlafaxine, one made him angry)  - quetiapine really affected him as well  - has also tried zoloft with no improvement  - has had therapy in the past, including anger management (got kicked out)  - no inpatient or outpatient treatment  - he doesn't feel depressed, but he has been having low energy and not wanting to socialize like normal.  - he currently feels like he is in a down-spin and can't stop the cycle.  - recently moved back home with his mother and they are butting heads.  - he is currently trying to get his own housing  - went to school for   - had stopped drinking for 10 years, but has now started up again  - his mind won't shut down at night  - feels tired during the day  - no hx of suicidal attempts  - no hallucinations or delusions  - PHQ-9 of 15 today  PHQ-9 SCORE 4/23/2013 8/27/2013 12/12/2013   PHQ-9 Total Score 21 21 15     ROS: Complete 6 point ROS completed and negative other than stated above.         Physical Exam:     Vitals:    12/12/18 1418   BP: 128/85   Pulse: 76   Resp: 22   Temp: 97.9  F (36.6  C)   SpO2: 96%   Weight: (!) 159.1 kg (350 lb 12.8 oz)   Height: 1.88 m (6' 2\")     Body mass index is 45.04 kg/m .  Vital signs normal except elevated weight    General: Alert and orientated in NAD. Pleasant and cooperative.   Cards: RRR, no murmurs, rubs or gallops. No lower extremity edema  Resp: clear vesicular breath sounds bilaterally, no crackles or wheezes. No " increased work of breathing  Psych: mood depressed, affect congruent. Normal thought process. Normal speech and tone. No evidence of hallucinations or delusions. Dressed appropriately for time of year. Hygiene upkept. No SI or HI.    Assessment and Plan     1. Major depression, chronic   2. Anxiety state  Possibly d/t more of a personality disorder, conduct disorder type/ODD type of diagnosis but non seen officially in chart review. No HI/SI or hallucinations/delusions today. Will start with therapy and hydroxyzine for worsening symptoms during and for improved sleep.  - MENTAL HEALTH REFERRAL  -  - hydrOXYzine (ATARAX) 25 MG tablet; Take 1-2 tablets (25-50 mg) by mouth every 6 hours as needed for anxiety Take 1 tablet during the day, 2 tablets at night.  Dispense: 60 tablet; Refill: 1    3. Gastroesophageal reflux disease without esophagitis  No red alarm symptoms. Rx refilled  - ranitidine (ZANTAC) 150 MG tablet; Take 1 tablet (150 mg) by mouth 2 times daily  Dispense: 120 tablet; Refill: 3    4. Chronic bilateral low back pain without sciatica  Educated about concern with hx of PUD while on large doses of ibuprofen. Will continue to discuss with him.  - naproxen (NAPROSYN) 500 MG tablet; Take 1 tablet (500 mg) by mouth 2 times daily as needed for moderate pain  Dispense: 90 tablet; Refill: 1    Follow up in 4 weeks for mood assessment.    Options for treatment and follow-up care were reviewed with the patient. Adan Rod  engaged in the decision making process and verbalized understanding of the options discussed and agreed with the final plan.    Precepted with: Dr. Filipe Durant MD (PGY3)  Pager: [phone]  [facility] Resident

## 2018-12-12 NOTE — PROGRESS NOTES
Preceptor Attestation:   Patient seen, evaluated and discussed with the resident, Dr. Christie Durant. I have verified the content of the note, which accurately reflects my assessment of the patient and the plan of care.  Supervising Physician:Mel Dent MD  [facility]

## 2018-12-14 ENCOUNTER — DOCUMENTATION ONLY (OUTPATIENT)
Dept: FAMILY MEDICINE | Facility: CLINIC | Age: 43
End: 2018-12-14

## 2018-12-14 NOTE — PROGRESS NOTES
Referral for (Test): Psychology   Location/Place/Provider: [facility] [address], [place] [address]   Date/Time:    Phone: [phone]   Fax:   Additional information/prep.: I called to register the pt with [facility], they will contact the pt and schedule an appointment.    Scheduled by: BASHIR Edwards

## 2018-12-14 NOTE — PROGRESS NOTES
Procedure Requested        9035     MENTAL HEALTH REFERRAL  -             [#[id number]]       Priority: Routine  Class: External referral       Comment:Use this form for behavioral health consults and assessments. The                referral coordinator will help to determine whether patients are                best served by clinic behavioral health staff or by community                providers.                                Type of referral(s) request   ed (indicate all that apply):                Adult Psychotherapy -- for diagnosis and non-pharmacological                 treatment                                Reason for referral: depression, anger managmenet                                Currently receiving mental health services (if 'Yes', what                 services and why today's referral?): No                Currently having suicidal thoughts: No                Previous psych hospitalization:    No                                Please provide data for below screening tools if available.                 PHQ-9 Score: 15                                 needed: No                Language: English     Associated Diagnoses       F34.1 Major depression, chronic       F41.1 Anxiety state       Adult or Child/Adolescent:  Adult           [facility]          [id number]               : 1975  [address]                              PCP: MARYCARMEN CALDERON *   [place] [address]                                CTR: [facility]

## 2018-12-18 ENCOUNTER — TELEPHONE (OUTPATIENT)
Dept: FAMILY MEDICINE | Facility: CLINIC | Age: 43
End: 2018-12-18

## 2018-12-18 NOTE — TELEPHONE ENCOUNTER
I got a call from Natanael, referral specialist from [facility].  He called to inform me that he has been trying to get a hold of the pt, it seems like the pt phone number isn't working at times.  I told him that I will try to get a hold of the pt, and have him call Natanael directed to schedule.      I called the pt, pt stated that he was driving, so he could not write down the phone number.  I told him that I will call him back, and have him not answer his phone.  I will leave Natanael phone number to call on his vm, and once he gets home, to get the phone number and call. I called and left a vm of Natanael contact phone number, and to give me a call back when it is scheduled for.

## 2019-08-21 DIAGNOSIS — J45.990 EXERCISE-INDUCED ASTHMA: ICD-10-CM

## 2019-08-21 RX ORDER — ALBUTEROL SULFATE 90 UG/1
2 AEROSOL, METERED RESPIRATORY (INHALATION) EVERY 4 HOURS PRN
Qty: 18 G | Refills: 1 | Status: SHIPPED | OUTPATIENT
Start: 2019-08-21

## 2019-09-16 ENCOUNTER — TELEPHONE (OUTPATIENT)
Dept: FAMILY MEDICINE | Facility: CLINIC | Age: 44
End: 2019-09-16

## 2019-09-16 NOTE — TELEPHONE ENCOUNTER
Called patient as he no showed 9/03/2019. Patient stated he forgot and didnt get a reminder call. Stated to patient we may have had some issues regarding to automated calling system at the time. Patient requested physical with Dr Velarde as it had been years and wanted one this week. I schedule patient with provider Syd 9/17/19 at 11am confirmed 3 times. He complained of needing a dentist to pull a tooth but wasn't sure where to go so I gave him community dental number off of [address] In [place] to see if they take his insurance otherwise he can call his insurance on back of card to see what placed accept his insurance for dental. He understood

## 2019-09-17 ENCOUNTER — OFFICE VISIT (OUTPATIENT)
Dept: FAMILY MEDICINE | Facility: CLINIC | Age: 44
End: 2019-09-17
Payer: MEDICARE

## 2019-09-17 VITALS
SYSTOLIC BLOOD PRESSURE: 119 MMHG | RESPIRATION RATE: 18 BRPM | TEMPERATURE: 98.2 F | HEIGHT: 74 IN | OXYGEN SATURATION: 96 % | DIASTOLIC BLOOD PRESSURE: 83 MMHG | BODY MASS INDEX: 40.43 KG/M2 | HEART RATE: 72 BPM | WEIGHT: 315 LBS

## 2019-09-17 DIAGNOSIS — M25.561 CHRONIC PAIN OF BOTH KNEES: ICD-10-CM

## 2019-09-17 DIAGNOSIS — R06.02 SOB (SHORTNESS OF BREATH): ICD-10-CM

## 2019-09-17 DIAGNOSIS — F32.9 MAJOR DEPRESSION, CHRONIC: ICD-10-CM

## 2019-09-17 DIAGNOSIS — G89.29 CHRONIC PAIN OF BOTH KNEES: ICD-10-CM

## 2019-09-17 DIAGNOSIS — M25.562 CHRONIC PAIN OF BOTH KNEES: ICD-10-CM

## 2019-09-17 DIAGNOSIS — Z00.01 ENCOUNTER FOR ROUTINE ADULT MEDICAL EXAM WITH ABNORMAL FINDINGS: ICD-10-CM

## 2019-09-17 DIAGNOSIS — R07.89 DISCOMFORT IN CHEST: ICD-10-CM

## 2019-09-17 DIAGNOSIS — J45.990 EXERCISE-INDUCED ASTHMA: Primary | ICD-10-CM

## 2019-09-17 DIAGNOSIS — G47.30 SLEEP APNEA, UNSPECIFIED TYPE: ICD-10-CM

## 2019-09-17 LAB
% GRANULOCYTES: 47.8 %G (ref 40–75)
ALBUMIN SERPL-MCNC: 3.8 G/DL (ref 3.3–4.6)
ALP SERPL-CCNC: 68 U/L (ref 40–150)
ALT SERPL-CCNC: 32 U/L (ref 0–45)
AST SERPL-CCNC: 35 U/L (ref 0–55)
BILIRUB SERPL-MCNC: 0.4 MG/DL (ref 0.2–1.3)
BUN SERPL-MCNC: 11 MG/DL (ref 5–24)
CALCIUM SERPL-MCNC: 9.3 MG/DL (ref 8.5–10.4)
CHLORIDE SERPLBLD-SCNC: 103 MMOL/L (ref 94–109)
CHOLEST SERPL-MCNC: 293 MG/DL
CO2 SERPL-SCNC: 28 MMOL/L (ref 20–32)
CREAT SERPL-MCNC: 1 MG/DL (ref 0.8–1.5)
EGFR CALCULATED (BLACK REFERENCE): >90 ML/MIN
EGFR CALCULATED (NON BLACK REFERENCE): 85.5 ML/MIN
FASTING?: NO
FEF 25/75: NORMAL
FEV-1: NORMAL
FEV1/FVC: NORMAL
FVC: NORMAL
GLUCOSE SERPL-MCNC: 108 MG/DL (ref 60–109)
GRANULOCYTES #: 2.4 K/UL (ref 1.6–8.3)
HBA1C MFR BLD: 5.5 % (ref 4.1–5.7)
HCT VFR BLD AUTO: 45.1 % (ref 40–53)
HDLC SERPL-MCNC: 49 MG/DL
HEMOGLOBIN: 13.8 G/DL (ref 13.3–17.7)
HIV 1+2 AB+HIV1 P24 AG SERPL QL IA: NEGATIVE
LDLC SERPL CALC-MCNC: 212 MG/DL
LYMPHOCYTES # BLD AUTO: 2.3 K/UL (ref 0.8–5.3)
LYMPHOCYTES NFR BLD AUTO: 46 %L (ref 20–48)
MCH RBC QN AUTO: 29.7 PG (ref 26.5–35)
MCHC RBC AUTO-ENTMCNC: 30.6 G/DL (ref 32–36)
MCV RBC AUTO: 97.3 FL (ref 78–100)
MID #: 0.3 K/UL (ref 0–2.2)
MID %: 6.2 %M (ref 0–20)
PLATELET # BLD AUTO: 313 K/UL (ref 150–450)
POTASSIUM SERPL-SCNC: 4.5 MMOL/L (ref 3.4–5.3)
PROT SERPL-MCNC: 7.6 G/DL (ref 6.6–8.8)
RBC # BLD AUTO: 4.64 M/UL (ref 4.4–5.9)
SODIUM SERPL-SCNC: 142 MMOL/L (ref 133–144)
TRIGL SERPL-MCNC: 158 MG/DL
TSH SERPL DL<=0.05 MIU/L-ACNC: 1.71 UIU/ML (ref 0.3–5)
WBC # BLD AUTO: 5.1 K/UL (ref 4–11)

## 2019-09-17 ASSESSMENT — MIFFLIN-ST. JEOR: SCORE: 2549.13

## 2019-09-17 NOTE — LETTER
"September 18, 2019      Adan Rod  [address] [place] [address]        Dear Adan,    The bad cholesterol is elevated, and we can talk about starting a medication called a \"statin\" at your next visit. Other labs looked good. Your thyroid labs returned normal. You are negative for HIV.  you do not have diabetes. All electrolytes look good. You are not anemic, so this is unlikely causing any shortness of breath. I will see you back for a visit in a few weeks!    Please see below for your test results.    Resulted Orders   CBC with Diff Plt ([facility])   Result Value Ref Range    WBC 5.1 4.0 - 11.0 K/uL    Lymphocytes # 2.3 0.8 - 5.3 K/uL    % Lymphocytes 46.0 20.0 - 48.0 %L    Mid # 0.3 0.0 - 2.2 K/uL    Mid % 6.2 0.0 - 20.0 %M    GRANULOCYTES # 2.4 1.6 - 8.3 K/uL    % Granulocytes 47.8 40.0 - 75.0 %G    RBC 4.64 4.40 - 5.90 M/uL    Hemoglobin 13.8 13.3 - 17.7 g/dL    Hematocrit 45.1 40.0 - 53.0 %    MCV 97.3 78.0 - 100.0 fL    MCH 29.7 26.5 - 35.0 pg    MCHC 30.6 (L) 32.0 - 36.0 g/dL    Platelets 313.0 150.0 - 450.0 K/uL   Lipid [place] ([facility])   Result Value Ref Range    Cholesterol 293 (H) <=199 mg/dL    Triglycerides 158 (H) <=149 mg/dL    HDL Cholesterol 49 >=40 mg/dL    LDL Cholesterol Calculated 212 (H) <=129 mg/dL    Fasting? No     Narrative    Test performed by:  [facility]  [address] [place] [address]       If you have any questions, please call the clinic to make an appointment.    Sincerely,    Allen Velarde MD  "

## 2019-09-17 NOTE — PROGRESS NOTES
Preceptor Attestation:   Patient seen, evaluated and discussed with the resident. I have verified the content of the note, which accurately reflects my assessment of the patient and the plan of care.  I have reviewed the spirometry and agree with the interpretation.  Supervising Physician:Konstantin Broussard MD  [facility]

## 2019-09-17 NOTE — PATIENT INSTRUCTIONS
Preventive Health Recommendations  Male Ages 40 to 49    Yearly exam:             See your health care provider every year in order to  o   Review health changes.   o   Discuss preventive care.    o   Review your medicines if your doctor has prescribed any.    You should be tested each year for STDs (sexually transmitted diseases) if you re at risk.     Have a cholesterol test every 5 years.     Have a colonoscopy (test for colon cancer) if someone in your family has had colon cancer or polyps before age 50.     After age 45, have a diabetes test (fasting glucose). If you are at risk for diabetes, you should have this test every 3 years.      Talk with your health care provider about whether or not a prostate cancer screening test (PSA) is right for you.    Shots: Get a flu shot each year. Get a tetanus shot every 10 years.     Nutrition:    Eat at least 5 servings of fruits and vegetables daily.     Eat whole-grain bread, whole-wheat pasta and brown rice instead of white grains and rice.     Get adequate Calcium and Vitamin D.     Lifestyle    Exercise for at least 150 minutes a week (30 minutes a day, 5 days a week). This will help you control your weight and prevent disease.     Limit alcohol to one drink per day.     No smoking.     Wear sunscreen to prevent skin cancer.     See your dentist every six months for an exam and cleaning.      My Asthma Action Plan  Name: Adan Rod  YOB: 1975  Date: 9/17/2019   My doctor: Allen Velarde   My clinic:   [facility] [address] [place] [address]  [phone]    My Asthma Severity: Intermittent / Exercise Induced Know your asthma triggers: exercise or sports      GREEN ZONE   Good Control    I feel good    No cough or wheeze    Can work, sleep and play without asthma symptoms       1. If exercise triggers your asthma, take your rescue medication (2 puffs of albuterol, Ventolin/Pro-Air) 15 minutes before exercise or sports,  and during exercise if you have asthma symptoms.  2. Spacer to use with inhaler: If you have a spacer, make sure to use it with your inhaler.              YELLOW ZONE Getting Worse  I have ANY of these:    I do not feel good    Cough or wheeze    Chest feels tight    Wake up at night   1. Keep taking your Green Zone medications.  2. Start taking your rescue medicine (1-2 puffs of albuterol - Ventolin/Pro-Air) every 4-6 hours as needed.  3. If symptoms are not controlled with above, can take 2 puffs every 20 minutes for up to 1 hour, then continue every 4 hours if needed.   4. If you do not return to the Green Zone in 12-24 hours or you get worse, call the clinic.         RED ZONE Medical Alert - Get Help  I have ANY of these:    I feel awful    Medicine is not helping    Breathing getting harder    Trouble walking or talking    Nose opens wide to breathe       1. Take your rescue medicine NOW (6-8 puffs of albuterol - Ventolin/Pro-Air) for every 20 minutes for up to 1 hour.  2. Call your doctor NOW.  3. If you are still in the Red Zone after 20 minutes and you have not reached your doctor:    Take your rescue medicine again (6-8 puffs of albuterol - Ventolin/Pro-Air) and    Call 911 or go to the emergency room right away    See your regular doctor within 1 weeks of an Emergency Room or Urgent Care visit for follow-up treatment.            Electronically signed by: Allen Velarde MD    Annual Reminders:  Meet with Asthma Educator,  Flu Shot in the Fall, Pneumonia Shot  Pharmacy: [id number] - [place], [place] - [address]    Referral for :      Orthopedic  LOCATION/PLACE/Provider :    [facility]- [place]  DATE & TIME :    Sept. 24th at 10:15 am   PHONE :     [phone]  FAX :    [phone]  Appointment made by clinic staff/:    [place]     Referral for :     Sleep Evaluation    LOC[facility]   DATE & TIME : Dec. 11th at 11 am       PHONE :     [phone]  FAX :    [phone]  Appointment made by clinic staff/:    [place]ina    Referral for :     Echocardiogram    LOCATION/PLACE/Provider :    [facility]  DATE & TIME :    faxed referral and visit notes, they will call to schedule him.   PHONE :     [phone]  FAX :    [phone]  Appointment made by clinic staff/:    [place]ina
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
## 2019-09-18 ASSESSMENT — PATIENT HEALTH QUESTIONNAIRE - PHQ9: SUM OF ALL RESPONSES TO PHQ QUESTIONS 1-9: 16

## 2019-09-19 ASSESSMENT — ASTHMA QUESTIONNAIRES: ACT_TOTALSCORE: 18

## 2019-09-20 ENCOUNTER — RECORDS - HEALTHEAST (OUTPATIENT)
Dept: ADMINISTRATIVE | Facility: OTHER | Age: 44
End: 2019-09-20

## 2019-09-20 ENCOUNTER — TELEPHONE (OUTPATIENT)
Dept: FAMILY MEDICINE | Facility: CLINIC | Age: 44
End: 2019-09-20

## 2019-09-20 ENCOUNTER — AMBULATORY - HEALTHEAST (OUTPATIENT)
Dept: SLEEP MEDICINE | Facility: CLINIC | Age: 44
End: 2019-09-20

## 2019-09-20 DIAGNOSIS — G47.30 SLEEP APNEA: ICD-10-CM

## 2019-09-20 NOTE — TELEPHONE ENCOUNTER
Called Diann to discuss. Diann was wondering if patient is to receive chemical or exercise echo. Per recent notes, advised patient is to receive exercise stress echo. Advised note is not complete but will fax for their records. Diann verbalized understanding. [facility]ed [facility] at [phone].

## 2019-09-20 NOTE — TELEPHONE ENCOUNTER
[facility] phone call message- general phone call:    Reason for call: Caller states they received an order and she's needs clarification on what the order is for. Please call and advise.     Return call needed: Yes    OK to leave a message on voice mail? Yes    Primary language: English      needed? No    Call taken on September 20, 2019 at 8:32 AM by Nella Baer

## 2019-09-30 ENCOUNTER — HOSPITAL ENCOUNTER (OUTPATIENT)
Dept: CARDIOLOGY | Facility: CLINIC | Age: 44
Discharge: HOME | End: 2019-09-30

## 2019-09-30 DIAGNOSIS — R06.02 SOB (SHORTNESS OF BREATH): ICD-10-CM

## 2019-09-30 DIAGNOSIS — R07.89 DISCOMFORT IN CHEST: ICD-10-CM

## 2019-09-30 LAB
CV STRESS CURRENT BP HE: NORMAL
CV STRESS CURRENT HR HE: 100
CV STRESS CURRENT HR HE: 116
CV STRESS CURRENT HR HE: 122
CV STRESS CURRENT HR HE: 133
CV STRESS CURRENT HR HE: 135
CV STRESS CURRENT HR HE: 137
CV STRESS CURRENT HR HE: 145
CV STRESS CURRENT HR HE: 149
CV STRESS CURRENT HR HE: 149
CV STRESS CURRENT HR HE: 150
CV STRESS CURRENT HR HE: 151
CV STRESS CURRENT HR HE: 154
CV STRESS CURRENT HR HE: 85
CV STRESS CURRENT HR HE: 85
CV STRESS CURRENT HR HE: 86
CV STRESS CURRENT HR HE: 86
CV STRESS CURRENT HR HE: 88
CV STRESS CURRENT HR HE: 90
CV STRESS CURRENT HR HE: 95
CV STRESS CURRENT HR HE: 96
CV STRESS CURRENT HR HE: 97
CV STRESS CURRENT HR HE: 98
CV STRESS DEVIATION TIME HE: NORMAL
CV STRESS ECHO PERCENT HR HE: NORMAL
CV STRESS EXERCISE STAGE HE: NORMAL
CV STRESS FINAL RESTING BP HE: NORMAL
CV STRESS FINAL RESTING HR HE: 86
CV STRESS MAX HR HE: 160
CV STRESS MAX TREADMILL GRADE HE: 12
CV STRESS MAX TREADMILL SPEED HE: 2.5
CV STRESS PEAK DIA BP HE: NORMAL
CV STRESS PEAK SYS BP HE: NORMAL
CV STRESS PHASE HE: NORMAL
CV STRESS PROTOCOL HE: NORMAL
CV STRESS RESTING PT POSITION HE: NORMAL
CV STRESS RESTING PT POSITION HE: NORMAL
CV STRESS ST DEVIATION AMOUNT HE: NORMAL
CV STRESS ST DEVIATION ELEVATION HE: NORMAL
CV STRESS ST EVELATION AMOUNT HE: NORMAL
CV STRESS TEST TYPE HE: NORMAL
CV STRESS TOTAL STAGE TIME MIN 1 HE: NORMAL
STRESS ECHO BASELINE BP: NORMAL
STRESS ECHO BASELINE HR: 91
STRESS ECHO CALCULATED PERCENT HR: 92 %
STRESS ECHO LAST STRESS BP: NORMAL
STRESS ECHO LAST STRESS HR: 149
STRESS ECHO POST ESTIMATED WORKLOAD: 7.1
STRESS ECHO POST EXERCISE DUR MIN: 5
STRESS ECHO POST EXERCISE DUR SEC: 59
STRESS ECHO TARGET HR: 148

## 2019-10-02 NOTE — PROGRESS NOTES
"       HPI:       Adan Rod is a 44 year old  Male who presents for follow up of concern(s) listed below    Mood, depression:  -From last visit \"Present since 2009. This plagues the patient's daily life. Sounds like it was sparked by TBI. Had seen psychiatry in the past, but expresses distrust in the psychiatric system, and doesn't want to end up hospitalized again. He is interested in following up with me in a few weeks to discuss this further, which I think is great. Will look for organic causes of depressed mood as well: CBC, TSH, CMP, BITA.\"  -Other labs returned normal, waiting to have sleep study  -PHQ9 of 16 today  -Really taking initiative in his health, which is inspiring him. He wants to be healthy.   -Irritated with the pain he is having, but he is planning to see ortho soon. Even when back is feeling better, he still has mood problems  -Doesn't get along with mom, and this is a big  of his mood problems  -Wants to live alone but the state doesn't allow this because he had a medical emergency at one point in time, and the state thinks someone always needs to be with him    Follow up stress test:  -Normal stress on 9/30, discussed results with patient    Cholesterol:  -LDL elevated to 212  -Interested in taking a statin medication after counseling on the topic    The 10-year ASCVD risk score (Lenora ASHLEY Jr., et al., 2013) is: 3.7%    Values used to calculate the score:      Age: 44 years      Sex: Male      Is Non- : Yes      Diabetic: No      Tobacco smoker: No      Systolic Blood Pressure: 109 mmHg      Is BP treated: No      HDL Cholesterol: 49 mg/dL      Total Cholesterol: 293 mg/dL                   PMHX:     Patient Active Problem List   Diagnosis     Sleep apnea     Anxiety state     Cataract     Major depression, chronic     Other chronic pain     Contact dermatitis and other eczema, due to unspecified cause     Generalized hyperhidrosis     Exercise-induced asthma "     Morbid obesity (H)     Peptic ulcer     Scoliosis (and kyphoscoliosis), idiopathic     Vitamin D deficiency     Pain, flank, bilateral     Chronic low back pain     Health Care Home     Knee pain     Allergic to bees     Hypercholesterolemia     Raised intraocular pressure     Thoracic and lumbosacral neuritis     Osteoarthritis of lumbosacral spine without myelopathy     Primary open angle glaucoma of both eyes, mild stage     Deprivation amblyopia of both eyes       Current Outpatient Medications   Medication Sig Dispense Refill     albuterol (PROAIR HFA) 108 (90 Base) MCG/ACT inhaler Inhale 2 puffs into the lungs every 4 hours as needed for shortness of breath / dyspnea 15 minutes prior to exercise. 18 g 1     brimonidine (ALPHAGAN) 0.2 % ophthalmic solution 1 drop       diphenhydrAMINE (BENADRYL) 25 MG tablet Take 2 tablets (50 mg) by mouth nightly as needed for itching or allergies 90 tablet 1     EPINEPHrine (EPIPEN) 0.3 MG/0.3ML injection Inject 0.3 mLs (0.3 mg) into the muscle as needed for anaphylaxis 0.6 mL 3     naproxen (NAPROSYN) 500 MG tablet Take 1 tablet (500 mg) by mouth 2 times daily as needed for moderate pain 90 tablet 1     ranitidine (ZANTAC) 150 MG tablet Take 1 tablet (150 mg) by mouth 2 times daily 120 tablet 3       Social History     Socioeconomic History     Marital status: Single     Spouse name: Not on file     Number of children: Not on file     Years of education: Not on file     Highest education level: Not on file   Occupational History     Not on file   Social Needs     Financial resource strain: Not on file     Food insecurity:     Worry: Not on file     Inability: Not on file     Transportation needs:     Medical: Not on file     Non-medical: Not on file   Tobacco Use     Smoking status: Former Smoker     Smokeless tobacco: Never Used   Substance and Sexual Activity     Alcohol use: No     Drug use: No     Sexual activity: Not on file   Lifestyle     Physical activity:      "Days per week: Not on file     Minutes per session: Not on file     Stress: Not on file   Relationships     Social connections:     Talks on phone: Not on file     Gets together: Not on file     Attends Church service: Not on file     Active member of club or organization: Not on file     Attends meetings of clubs or organizations: Not on file     Relationship status: Not on file     Intimate partner violence:     Fear of current or ex partner: Not on file     Emotionally abused: Not on file     Physically abused: Not on file     Forced sexual activity: Not on file   Other Topics Concern     Parent/sibling w/ CABG, MI or angioplasty before 65F 55M? Not Asked   Social History Narrative     Not on file          Allergies   Allergen Reactions     Penicillins Difficulty breathing     And hives     Bee Venom      Ibuprofen Sodium      Percocet [Oxycodone-Acetaminophen]        No results found for this or any previous visit (from the past 24 hour(s)).         Review of Systems:     A 6 point review of systems is negative unless stated in HPI          Physical Exam:     Vitals:    10/04/19 0957   BP: 109/76   Pulse: 98   Resp: 22   Temp: 97.8  F (36.6  C)   SpO2: 97%   Weight: (!) 155.9 kg (343 lb 9.6 oz)   Height: 1.88 m (6' 2\")     Body mass index is 44.12 kg/m .    GENERAL APPEARANCE: healthy, alert and no distress  MS: extremities normal- no gross deformities noted  NEURO: Normal strength and tone, sensory exam grossly normal, mentation appears intact and speech normal  PSYCH: Flat affect but openly conversant      Assessment and Plan     1. Major depression, chronic  Very long discussion with Adan today about his mood. It seems that it is tied to his lack of independence. He has applied for other housing and I congratulated him on this. He will follow up in 3 weeks to discuss his mood.    2. Hyperlipidemia LDL goal <100   which qualifies patient for high-intensity statin. Recent CMP shows good LFTs. Will " initiate atorvastatin 40 mg. Follow up in 3-4 weeks.    3. H/O exercise stress test  Reassuring exercise stress testing. Reassured patient that no ischemia is a good sign. He seemed to be relieved by this. Again, he seems to be really taking initiative. Congratulated him on the initiative.       Options for treatment and follow-up care were reviewed with the patient and/or guardian. Adan Rod and/or guardian engaged in the decision making process and verbalized understanding of the options discussed and agreed with the final plan.    Allen Velarde MD  [facility] Resident, PGY2      Precepted today with: Christi Ferguson MD

## 2019-10-04 ENCOUNTER — OFFICE VISIT (OUTPATIENT)
Dept: FAMILY MEDICINE | Facility: CLINIC | Age: 44
End: 2019-10-04
Payer: MEDICARE

## 2019-10-04 VITALS
WEIGHT: 315 LBS | BODY MASS INDEX: 40.43 KG/M2 | HEIGHT: 74 IN | SYSTOLIC BLOOD PRESSURE: 109 MMHG | TEMPERATURE: 97.8 F | HEART RATE: 98 BPM | RESPIRATION RATE: 22 BRPM | DIASTOLIC BLOOD PRESSURE: 76 MMHG | OXYGEN SATURATION: 97 %

## 2019-10-04 DIAGNOSIS — F32.9 MAJOR DEPRESSION, CHRONIC: Primary | ICD-10-CM

## 2019-10-04 DIAGNOSIS — E78.5 HYPERLIPIDEMIA LDL GOAL <100: ICD-10-CM

## 2019-10-04 DIAGNOSIS — Z92.89 H/O EXERCISE STRESS TEST: ICD-10-CM

## 2019-10-04 RX ORDER — ATORVASTATIN CALCIUM 40 MG/1
40 TABLET, FILM COATED ORAL DAILY
Qty: 30 TABLET | Refills: 0 | Status: SHIPPED | OUTPATIENT
Start: 2019-10-04 | End: 2019-11-05

## 2019-10-04 ASSESSMENT — ANXIETY QUESTIONNAIRES
7. FEELING AFRAID AS IF SOMETHING AWFUL MIGHT HAPPEN: NOT AT ALL
6. BECOMING EASILY ANNOYED OR IRRITABLE: SEVERAL DAYS
2. NOT BEING ABLE TO STOP OR CONTROL WORRYING: SEVERAL DAYS
5. BEING SO RESTLESS THAT IT IS HARD TO SIT STILL: NEARLY EVERY DAY
1. FEELING NERVOUS, ANXIOUS, OR ON EDGE: SEVERAL DAYS
3. WORRYING TOO MUCH ABOUT DIFFERENT THINGS: SEVERAL DAYS
GAD7 TOTAL SCORE: 10
IF YOU CHECKED OFF ANY PROBLEMS ON THIS QUESTIONNAIRE, HOW DIFFICULT HAVE THESE PROBLEMS MADE IT FOR YOU TO DO YOUR WORK, TAKE CARE OF THINGS AT HOME, OR GET ALONG WITH OTHER PEOPLE: SOMEWHAT DIFFICULT

## 2019-10-04 ASSESSMENT — PATIENT HEALTH QUESTIONNAIRE - PHQ9
5. POOR APPETITE OR OVEREATING: NEARLY EVERY DAY
SUM OF ALL RESPONSES TO PHQ QUESTIONS 1-9: 16

## 2019-10-04 ASSESSMENT — MIFFLIN-ST. JEOR: SCORE: 2508.31

## 2019-10-05 ASSESSMENT — ASTHMA QUESTIONNAIRES: ACT_TOTALSCORE: 20

## 2019-10-05 ASSESSMENT — ANXIETY QUESTIONNAIRES: GAD7 TOTAL SCORE: 10

## 2019-10-07 NOTE — PROGRESS NOTES
Preceptor Attestation:  Patient's case reviewed and discussed with  Patient seen and discussed with the resident..  I agree with written assessment and plan of care.  Supervising Physician:  Tamiko Ferguson MD  [facility]

## 2019-10-21 ENCOUNTER — TRANSFERRED RECORDS (OUTPATIENT)
Dept: HEALTH INFORMATION MANAGEMENT | Facility: CLINIC | Age: 44
End: 2019-10-21

## 2019-10-24 NOTE — PROGRESS NOTES
"       HPI:       Adan Rod is a 44 year old  Male who presents for follow up of concern(s) listed below    Mood, depression:  -From initial visit \"Present since 2009. This plagues the patient's daily life. Sounds like it was sparked by TBI. Had seen psychiatry in the past, but expresses distrust in the psychiatric system, and doesn't want to end up hospitalized again. He is interested in following up with me in a few weeks to discuss this further, which I think is great. Will look for organic causes of depressed mood as well: CBC, TSH, CMP, BITA.\"  -Other labs returned normal, yet to have sleep study. Trying to reach out to have this done.  -PHQ9 not filled out today, he is too tired to do this he states  -Really taking initiative in his health, which is inspiring him. He wants to be healthy.   -Irritated with the pain he is having, but he is going to see ortho soon on 10/29. Even when back is feeling better, he still has mood problems  -Doesn't get along with mom, and this is a big  of his mood problems  -Wants to live alone but the state doesn't allow this because he had a medical emergency at one point in time, and the state thinks someone always needs to be with him. He states he is on a ten year waiting list for low-income housing.     Cholesterol:  -LDL elevated to 212  -Started on high-intensity statin at last visit    Bee sting:  -4 days ago, stung by bee  -Thinks stinger is still in there  -Getting headaches because of it          PMHX:     Patient Active Problem List   Diagnosis     Sleep apnea     Anxiety state     Cataract     Major depression, chronic     Other chronic pain     Contact dermatitis and other eczema, due to unspecified cause     Generalized hyperhidrosis     Exercise-induced asthma     Morbid obesity (H)     Peptic ulcer     Scoliosis (and kyphoscoliosis), idiopathic     Vitamin D deficiency     Pain, flank, bilateral     Chronic low back pain     Health Care Home     Knee " pain     Allergic to bees     Hyperlipidemia LDL goal <100     Raised intraocular pressure     Thoracic and lumbosacral neuritis     Osteoarthritis of lumbosacral spine without myelopathy     Primary open angle glaucoma of both eyes, mild stage     Deprivation amblyopia of both eyes       Current Outpatient Medications   Medication Sig Dispense Refill     albuterol (PROAIR HFA) 108 (90 Base) MCG/ACT inhaler Inhale 2 puffs into the lungs every 4 hours as needed for shortness of breath / dyspnea 15 minutes prior to exercise. 18 g 1     atorvastatin (LIPITOR) 40 MG tablet Take 1 tablet (40 mg) by mouth daily 30 tablet 0     brimonidine (ALPHAGAN) 0.2 % ophthalmic solution 1 drop       diphenhydrAMINE (BENADRYL) 25 MG tablet Take 2 tablets (50 mg) by mouth nightly as needed for itching or allergies 90 tablet 1     EPINEPHrine (EPIPEN) 0.3 MG/0.3ML injection Inject 0.3 mLs (0.3 mg) into the muscle as needed for anaphylaxis 0.6 mL 3     naproxen (NAPROSYN) 500 MG tablet Take 1 tablet (500 mg) by mouth 2 times daily as needed for moderate pain 90 tablet 1     ranitidine (ZANTAC) 150 MG tablet Take 1 tablet (150 mg) by mouth 2 times daily 120 tablet 3       Social History     Socioeconomic History     Marital status: Single     Spouse name: Not on file     Number of children: Not on file     Years of education: Not on file     Highest education level: Not on file   Occupational History     Not on file   Social Needs     Financial resource strain: Not on file     Food insecurity:     Worry: Not on file     Inability: Not on file     Transportation needs:     Medical: Not on file     Non-medical: Not on file   Tobacco Use     Smoking status: Former Smoker     Smokeless tobacco: Never Used   Substance and Sexual Activity     Alcohol use: No     Drug use: No     Sexual activity: Not on file   Lifestyle     Physical activity:     Days per week: Not on file     Minutes per session: Not on file     Stress: Not on file    Relationships     Social connections:     Talks on phone: Not on file     Gets together: Not on file     Attends [employer] service: Not on file     Active member of club or organization: Not on file     Attends meetings of clubs or organizations: Not on file     Relationship status: Not on file     Intimate partner violence:     Fear of current or ex partner: Not on file     Emotionally abused: Not on file     Physically abused: Not on file     Forced sexual activity: Not on file   Other Topics Concern     Parent/sibling w/ CABG, MI or angioplasty before 65F 55M? Not Asked   Social History Narrative     Not on file          Allergies   Allergen Reactions     Penicillins Difficulty breathing     And hives     Bee Venom      Ibuprofen Sodium      Percocet [Oxycodone-Acetaminophen]        Results for orders placed or performed in visit on 10/25/19 (from the past 24 hour(s))   Comprehensive Metabolic Panel ([facility]) - results <1hr Protocol   Result Value Ref Range    Glucose 97.0 60.0 - 109.0 mg/dL    Urea Nitrogen 12.0 5.0 - 24.0 mg/dL    Creatinine 1.1 0.8 - 1.5 mg/dL    Sodium 143.0 133.0 - 144.0 mmol/L    Potassium 4.0 3.4 - 5.3 mmol/L    Chloride 101.0 94.0 - 109.0 mmol/L    Carbon Dioxide 26.0 20.0 - 32.0 mmol/L    Calcium 9.6 8.5 - 10.4 mg/dL    Protein Total 7.7 6.6 - 8.8 g/dL    Albumin 4.3 3.3 - 4.6 g/dL    Alkaline Phosphatase 76.0 40.0 - 150.0 U/L    ALT 42.0 0.0 - 45.0 U/L    AST 29.0 0.0 - 55.0 U/L    Bilirubin Total 1.0 0.2 - 1.3 mg/dL    eGFR Calculated (Non Black Reference) 76.6 >60.0 mL/min    eGFR Calculated (Black Reference) >90 >60.0 mL/min            Review of Systems:     A 6 point review of systems is negative unless stated in HPI          Physical Exam:     There were no vitals filed for this visit.  There is no height or weight on file to calculate BMI.    GENERAL APPEARANCE: healthy, alert and no distress  MS: extremities normal- no gross deformities noted  NEURO: Normal strength and tone,  sensory exam grossly normal, mentation appears intact and speech normal  SKIN: Small crusting lesion about 3 mm in size on back of left neck. US evaluation shows no echogenic structure concerning for foreign body.  PSYCH: Flat affect but openly conversant      Assessment and Plan     1. Major depression, chronic  Very long discussion with Adan today about his mood. It seems that it is tied to his lack of independence. He has applied for other housing and I congratulated him on this. He will follow up in 4 weeks to discuss his mood. In addition, will re-submit for sleep study.   -Will place another sleep study referral    2. Hyperlipidemia LDL goal <100   which qualifies patient for high-intensity statin. Started atorvastatin 40 mg at last visit. CMP looks good today, no concern for liver toxicity.    3. Bee sting reaction, undetermined intent, initial encounter  No foreign body on US. No fluctuance or pocket of fluid to suggest abscess. Think that patient is irritating the area which is causing pain and headache. Told patient to leave the lesion alone and call in if not improving.        Options for treatment and follow-up care were reviewed with the patient and/or guardian. Adan Rod and/or guardian engaged in the decision making process and verbalized understanding of the options discussed and agreed with the final plan.    Allen Velarde MD  [facility] Resident, PGY2      Precepted today with: Rebekah Del Angel MD

## 2019-10-25 ENCOUNTER — OFFICE VISIT (OUTPATIENT)
Dept: FAMILY MEDICINE | Facility: CLINIC | Age: 44
End: 2019-10-25
Payer: MEDICARE

## 2019-10-25 DIAGNOSIS — T63.444A BEE STING REACTION, UNDETERMINED INTENT, INITIAL ENCOUNTER: ICD-10-CM

## 2019-10-25 DIAGNOSIS — F32.9 MAJOR DEPRESSION, CHRONIC: ICD-10-CM

## 2019-10-25 DIAGNOSIS — E78.5 HYPERLIPIDEMIA LDL GOAL <100: Primary | ICD-10-CM

## 2019-10-25 LAB
ALBUMIN SERPL-MCNC: 4.3 G/DL (ref 3.3–4.6)
ALP SERPL-CCNC: 76 U/L (ref 40–150)
ALT SERPL-CCNC: 42 U/L (ref 0–45)
AST SERPL-CCNC: 29 U/L (ref 0–55)
BILIRUB SERPL-MCNC: 1 MG/DL (ref 0.2–1.3)
BUN SERPL-MCNC: 12 MG/DL (ref 5–24)
CALCIUM SERPL-MCNC: 9.6 MG/DL (ref 8.5–10.4)
CHLORIDE SERPLBLD-SCNC: 101 MMOL/L (ref 94–109)
CO2 SERPL-SCNC: 26 MMOL/L (ref 20–32)
CREAT SERPL-MCNC: 1.1 MG/DL (ref 0.8–1.5)
EGFR CALCULATED (BLACK REFERENCE): >90 ML/MIN
EGFR CALCULATED (NON BLACK REFERENCE): 76.6 ML/MIN
GLUCOSE SERPL-MCNC: 97 MG/DL (ref 60–109)
POTASSIUM SERPL-SCNC: 4 MMOL/L (ref 3.4–5.3)
PROT SERPL-MCNC: 7.7 G/DL (ref 6.6–8.8)
SODIUM SERPL-SCNC: 143 MMOL/L (ref 133–144)

## 2019-10-25 NOTE — PROGRESS NOTES
Preceptor Attestation:   Patient seen, evaluated and discussed with the resident. I have verified the content of the note, which accurately reflects my assessment of the patient and the plan of care.  Supervising Physician:Rebekah Del Angel MD  [facility]

## 2019-10-25 NOTE — PATIENT INSTRUCTIONS
Referral for :     Sleep study    LOCATION/PLACE/Provider :    [facility]   DATE & TIME :  Dec. 12th 10:55 am     PHONE :     [phone]  FAX :    [phone]  Appointment made by clinic staff/:    [place]

## 2019-11-05 DIAGNOSIS — E78.5 HYPERLIPIDEMIA LDL GOAL <100: ICD-10-CM

## 2019-11-06 RX ORDER — ATORVASTATIN CALCIUM 40 MG/1
40 TABLET, FILM COATED ORAL DAILY
Qty: 30 TABLET | Refills: 3 | Status: SHIPPED | OUTPATIENT
Start: 2019-11-06 | End: 2020-02-25

## 2019-12-12 ENCOUNTER — OFFICE VISIT - HEALTHEAST (OUTPATIENT)
Dept: SLEEP MEDICINE | Facility: CLINIC | Age: 44
End: 2019-12-12

## 2019-12-12 ENCOUNTER — TRANSFERRED RECORDS (OUTPATIENT)
Dept: HEALTH INFORMATION MANAGEMENT | Facility: CLINIC | Age: 44
End: 2019-12-12

## 2019-12-12 DIAGNOSIS — G47.10 HYPERSOMNIA: ICD-10-CM

## 2019-12-12 DIAGNOSIS — E66.01 MORBID OBESITY (H): ICD-10-CM

## 2019-12-12 DIAGNOSIS — R06.83 SNORING: ICD-10-CM

## 2019-12-12 DIAGNOSIS — G47.8 NON-RESTORATIVE SLEEP: ICD-10-CM

## 2019-12-12 ASSESSMENT — MIFFLIN-ST. JEOR: SCORE: 2531.88

## 2019-12-27 DIAGNOSIS — G89.29 CHRONIC BILATERAL LOW BACK PAIN WITHOUT SCIATICA: ICD-10-CM

## 2019-12-27 DIAGNOSIS — M54.50 CHRONIC BILATERAL LOW BACK PAIN WITHOUT SCIATICA: ICD-10-CM

## 2019-12-28 RX ORDER — NAPROXEN 500 MG/1
500 TABLET ORAL 2 TIMES DAILY PRN
Qty: 90 TABLET | Refills: 3 | Status: SHIPPED | OUTPATIENT
Start: 2019-12-28 | End: 2023-03-28

## 2020-01-06 ENCOUNTER — OFFICE VISIT (OUTPATIENT)
Dept: FAMILY MEDICINE | Facility: CLINIC | Age: 45
End: 2020-01-06
Payer: COMMERCIAL

## 2020-01-06 VITALS
WEIGHT: 315 LBS | RESPIRATION RATE: 20 BRPM | TEMPERATURE: 98.3 F | DIASTOLIC BLOOD PRESSURE: 75 MMHG | BODY MASS INDEX: 44.17 KG/M2 | HEART RATE: 75 BPM | OXYGEN SATURATION: 96 % | SYSTOLIC BLOOD PRESSURE: 119 MMHG

## 2020-01-06 DIAGNOSIS — J01.90 ACUTE SINUSITIS WITH SYMPTOMS > 10 DAYS: Primary | ICD-10-CM

## 2020-01-06 DIAGNOSIS — J30.2 CHRONIC SEASONAL ALLERGIC RHINITIS: ICD-10-CM

## 2020-01-06 RX ORDER — CEFDINIR 300 MG/1
300 CAPSULE ORAL 2 TIMES DAILY
Qty: 20 CAPSULE | Refills: 0 | Status: SHIPPED | OUTPATIENT
Start: 2020-01-06 | End: 2020-03-02

## 2020-01-06 RX ORDER — DIPHENHYDRAMINE HCL 25 MG
50 TABLET ORAL
Qty: 90 TABLET | Refills: 1 | Status: SHIPPED | OUTPATIENT
Start: 2020-01-06 | End: 2020-03-02

## 2020-01-06 NOTE — PROGRESS NOTES
Chief Complaint   Patient presents with     Cough     Refill Request     Benadryl            HPI:       Adan Rod is a 44 year old  male with a significant past medical history of allergies who presents for the new concern(s) of    1. Cough: 3 weeks: Sinus pressure, Nasal drainage and congestion, cough is worse at night, has been using DayQuil and Robitussin, no fevers, has not felt chilled    Cough is productive- mucus, has a cold, pain in chest, no shortness of breathing , has had some wheezing/rattling in chest    Recent sleep medicine visit       PMHX:     Patient Active Problem List   Diagnosis     Sleep apnea     Anxiety state     Cataract     Major depression, chronic     Other chronic pain     Contact dermatitis and other eczema, due to unspecified cause     Generalized hyperhidrosis     Exercise-induced asthma     Morbid obesity (H)     Peptic ulcer     Scoliosis (and kyphoscoliosis), idiopathic     Vitamin D deficiency     Pain, flank, bilateral     Chronic low back pain     Health Care Home     Knee pain     Allergic to bees     Hyperlipidemia LDL goal <100     Raised intraocular pressure     Thoracic and lumbosacral neuritis     Osteoarthritis of lumbosacral spine without myelopathy     Primary open angle glaucoma of both eyes, mild stage     Deprivation amblyopia of both eyes       Current Outpatient Medications   Medication Sig Dispense Refill     albuterol (PROAIR HFA) 108 (90 Base) MCG/ACT inhaler Inhale 2 puffs into the lungs every 4 hours as needed for shortness of breath / dyspnea 15 minutes prior to exercise. 18 g 1     atorvastatin (LIPITOR) 40 MG tablet Take 1 tablet (40 mg) by mouth daily 30 tablet 3     cefdinir (OMNICEF) 300 MG capsule Take 1 capsule (300 mg) by mouth 2 times daily 20 capsule 0     diphenhydrAMINE (BENADRYL) 25 MG tablet Take 2 tablets (50 mg) by mouth nightly as needed for itching or allergies 90 tablet 1     EPINEPHrine (EPIPEN) 0.3 MG/0.3ML injection Inject 0.3 mLs  (0.3 mg) into the muscle as needed for anaphylaxis 0.6 mL 3     naproxen (NAPROSYN) 500 MG tablet Take 1 tablet (500 mg) by mouth 2 times daily as needed for moderate pain 90 tablet 3     ranitidine (ZANTAC) 150 MG tablet Take 1 tablet (150 mg) by mouth 2 times daily 120 tablet 3     brimonidine (ALPHAGAN) 0.2 % ophthalmic solution 1 drop                Allergies   Allergen Reactions     Penicillins Difficulty breathing     And hives     Bee Venom      Ibuprofen Sodium      Percocet [Oxycodone-Acetaminophen]        No results found for this or any previous visit (from the past 24 hour(s)).           Physical Exam:     Vitals:    01/06/20 1036   BP: 119/75   Pulse: 75   Resp: 20   Temp: 98.3  F (36.8  C)   TempSrc: Oral   SpO2: 96%   Weight: (!) 156 kg (344 lb)     Body mass index is 44.17 kg/m .    GENERAL APPEARANCE: healthy, alert and no distress,  EYES: Eyes grossly normal to inspection,  PERRL  HENT: ear canals and TM's normal. Nose: Enlarged turbinates and erythematous. Mouth without ulcers or lesions  Tenderness with palpation of frontal sinuses.  NECK: no adenopathy, no asymmetry, masses, or scars and thyroid normal to palpation  RESP: lungs clear to auscultation - no rales, rhonchi or wheezes  CV: regular rate and rhythm,  and no murmur, click,  rub or gallop      Assessment and Plan     1. Acute sinusitis with symptoms > 10 days  Discussed symptomatic cares, encouraged 5 mL of honey for cough daily and tylenol as needed for fever/pain. Return to clinic if worsening of symptoms.  - cefdinir (OMNICEF) 300 MG capsule; Take 1 capsule (300 mg) by mouth 2 times daily  Dispense: 20 capsule; Refill: 0    2. Chronic seasonal allergic rhinitis  - diphenhydrAMINE (BENADRYL) 25 MG tablet; Take 2 tablets (50 mg) by mouth nightly as needed for itching or allergies  Dispense: 90 tablet; Refill: 1    Plan for overnight sleep study, awaiting to be scheduled.    Options for treatment and follow-up care were reviewed with the  patient and/or guardian. Adan Rod and/or guardian engaged in the decision making process and verbalized understanding of the options discussed and agreed with the final plan.    Nora Sharif,

## 2020-01-09 DIAGNOSIS — R06.83 SNORING: ICD-10-CM

## 2020-01-09 DIAGNOSIS — G47.8 NON-RESTORATIVE SLEEP: ICD-10-CM

## 2020-01-09 DIAGNOSIS — G47.10 HYPERSOMNIA: Primary | ICD-10-CM

## 2020-01-09 DIAGNOSIS — E66.01 MORBID OBESITY (H): ICD-10-CM

## 2020-01-13 DIAGNOSIS — K21.9 GASTROESOPHAGEAL REFLUX DISEASE WITHOUT ESOPHAGITIS: ICD-10-CM

## 2020-02-03 ENCOUNTER — OFFICE VISIT (OUTPATIENT)
Dept: FAMILY MEDICINE | Facility: CLINIC | Age: 45
End: 2020-02-03
Payer: COMMERCIAL

## 2020-02-03 VITALS
TEMPERATURE: 97.9 F | OXYGEN SATURATION: 97 % | BODY MASS INDEX: 40.43 KG/M2 | HEART RATE: 81 BPM | RESPIRATION RATE: 18 BRPM | HEIGHT: 74 IN | DIASTOLIC BLOOD PRESSURE: 79 MMHG | WEIGHT: 315 LBS | SYSTOLIC BLOOD PRESSURE: 115 MMHG

## 2020-02-03 DIAGNOSIS — F41.1 ANXIETY STATE: ICD-10-CM

## 2020-02-03 DIAGNOSIS — Z01.818 PREOP GENERAL PHYSICAL EXAM: Primary | ICD-10-CM

## 2020-02-03 DIAGNOSIS — J31.0 CHRONIC RHINITIS: ICD-10-CM

## 2020-02-03 DIAGNOSIS — J45.990 EXERCISE-INDUCED ASTHMA: ICD-10-CM

## 2020-02-03 DIAGNOSIS — G47.30 SLEEP APNEA, UNSPECIFIED TYPE: ICD-10-CM

## 2020-02-03 DIAGNOSIS — E66.01 MORBID OBESITY (H): ICD-10-CM

## 2020-02-03 DIAGNOSIS — H26.9 CATARACT OF BOTH EYES, UNSPECIFIED CATARACT TYPE: ICD-10-CM

## 2020-02-03 RX ORDER — IPRATROPIUM BROMIDE 42 UG/1
2 SPRAY, METERED NASAL 2 TIMES DAILY
Qty: 15 ML | Refills: 1 | Status: SHIPPED | OUTPATIENT
Start: 2020-02-03 | End: 2020-03-02

## 2020-02-03 ASSESSMENT — MIFFLIN-ST. JEOR: SCORE: 2507.4

## 2020-02-03 NOTE — PATIENT INSTRUCTIONS
Pre-op faxed to fax number : [phone] (Fax)    [facility]  Date of Surgery : 2/10/2020 and 2/24/2020  By: ELSA Elias      Presurgery Checklist  You are scheduled to have surgery. The healthcare staff will try to make your stay comfortable. Use the guidelines below to remind yourself what to do before surgery. Be sure to follow any specific pre-op instructions from your surgeon or nurse.   Preparing for Surgery  Ask your surgeon if you ll need a blood transfusion during surgery and if so, how to prepare for it. In some cases, you can donate blood before surgery. If needed, this blood can be given back (transfused) to you during or after surgery.  If you are having abdominal surgery, ask what you need to do to clear your bowel.  Tell your surgeon if you have allergies to any medications or foods.  Arrange for an adult family member or friend to drive you home after surgery. If possible, have someone ready to help you at home as you recover.  Call the surgeon if you get a cold, fever, sore throat, diarrhea, or other health problem just before surgery. Your surgeon can decide whether or not to postpone the surgery.  Medications  Tell your surgeon about all medications you take, including prescription and over-the-counter products such as herbal remedies and vitamins. Ask if you should continue taking them.  If you take ibuprofen, naproxen, or  blood thinners  such as aspirin, clopidogrel (Plavix), or warfarin (Coumadin), ask your surgeon whether you should stop taking them and how long before surgery you should stop.  You may be told to take antibiotics just before surgery to prevent infection. If so, follow instructions carefully on how to take them.  If you are told to take medications called anticoagulants to prevent blood clots after surgery, be sure to follow the instructions on how to take them.  Stop Smoking  If you smoke, healing may take longer. So at least 2 week(s) before surgery,  stop smoking.  Bathing or Showering Before Surgery  If instructed, wash with antibacterial soap. Afterward, do not use lotions or powders.  If you are having surgery on the head, you may be asked to shampoo with antibacterial soap. Follow instructions for doing so.  Do Not Remove Hair from the Surgery Site  Do not shave hair from the incision site, unless you are given specific instructions to do so. Usually, if hair needs to be removed, it will be done at the hospital right before surgery.  Don t Eat or Drink  Your doctor will tell you when to stop eating and drinking. If you do not follow your doctor's instructions, your procedure may be postponed or rescheduled for another day.  If your surgeon tells you to continue any medications, take them with small sips of water.  You can brush your teeth and rinse your mouth, but don t swallow any water.  Day of Surgery  Do not wear makeup. Do not use perfume, deodorant, or hairspray. Remove nail polish and artificial nails.  Leave jewelry (including rings), watches, and other valuables at home.  Be sure to bring health insurance cards or forms and a photo ID.  Bring a list of your medications (include the name, dose, how often you take them, and the time last dose was taken).  Arrive on time at the hospital or surgery facility.

## 2020-02-03 NOTE — PROGRESS NOTES
[facility] [address]  [place] [address]  Phone: [phone]  Fax: [phone]    2/3/2020    Adult PRE-OP Evaluation:    Adan Rod, 1975 presents for pre-operative evaluation and assessment as requested by Dr. Castaneda, prior to undergoing surgery/procedure for treatment of  Cataracts .    Proposed procedure: Cataract surgery. Right eye 2/10, left 2/24    Date of Surgery/ Procedure: 2/10 and 2/24  Hospital/Surgical Facility: [facility].      Primary Physician: Allen Velarde  Type of Anesthesia Anticipated: Topical and MAC  History of anesthesia complications: NONE  History of  abnormal bleeding: NONE   History of blood transfusions: NO  Patient has a Health Care Directive or Living Will:  NO    Preoperative Questions   1. NO - Do you have a history of heart attack, stroke, stent, bypass or surgery on an artery in the head, neck, heart or legs?  2. NO - Do you ever have any pain or discomfort in your chest?  3. NO - Have you ever had a severe pain across the front of your chest lasting for half an hour or more?  4. NO - Do you have a history of Congestive Heart Failure?  5. NO - Are you troubled by shortness of breath when: walking on the level/ up a slight hill/ at night?  6. NO - Does your chest ever sound wheezy or whistling?  7. NO - Do you currently have a cold, bronchitis or other respiratory infection?  8. NO - Have you had a cold, bronchitis or other respiratory infection within the last 2 weeks?  9. NO - Do you usually have a cough?  10. NO - Do you sometimes get pains in the calves of your legs when you walk?  11. NO - Do you or anyone in your family have previous history of blood clots?  12. NO - Do you or does anyone in your family have a serious bleeding problem such as prolonged bleeding following surgeries or cuts?  13. NO - Have you ever had problems with anemia or been told to take iron pills?  14. NO - Have you had any abnormal  blood loss such as black, tarry or bloody stools, or abnormal vaginal bleeding?  15. NO - Have you ever had a blood transfusion?  16. NO - Have you or any of your relatives ever had problems with anesthesia?  17. NO - Do you have sleep apnea, excessive snoring or daytime drowsiness?  18. NO - Do you have any prosthetic heart valves?  19. NO - Do you have prosthetic joints?  20. NO - Is there any chance that you may be pregnant?    Patient Active Problem List   Diagnosis     Sleep apnea     Anxiety state     Cataract     Major depression, chronic     Other chronic pain     Contact dermatitis and other eczema, due to unspecified cause     Generalized hyperhidrosis     Exercise-induced asthma     Morbid obesity (H)     Peptic ulcer     Scoliosis (and kyphoscoliosis), idiopathic     Vitamin D deficiency     Pain, flank, bilateral     Chronic low back pain     Health Care Home     Knee pain     Allergic to bees     Hyperlipidemia LDL goal <100     Raised intraocular pressure     Thoracic and lumbosacral neuritis     Osteoarthritis of lumbosacral spine without myelopathy     Primary open angle glaucoma of both eyes, mild stage     Deprivation amblyopia of both eyes       albuterol (PROAIR HFA) 108 (90 Base) MCG/ACT inhaler, Inhale 2 puffs into the lungs every 4 hours as needed for shortness of breath / dyspnea 15 minutes prior to exercise.  atorvastatin (LIPITOR) 40 MG tablet, Take 1 tablet (40 mg) by mouth daily  cefdinir (OMNICEF) 300 MG capsule, Take 1 capsule (300 mg) by mouth 2 times daily  diphenhydrAMINE (BENADRYL) 25 MG tablet, Take 2 tablets (50 mg) by mouth nightly as needed for itching or allergies  EPINEPHrine (EPIPEN) 0.3 MG/0.3ML injection, Inject 0.3 mLs (0.3 mg) into the muscle as needed for anaphylaxis  naproxen (NAPROSYN) 500 MG tablet, Take 1 tablet (500 mg) by mouth 2 times daily as needed for moderate pain  ranitidine (ZANTAC) 150 MG tablet, Take 1 tablet (150 mg) by mouth 2 times daily  brimonidine  (ALPHAGAN) 0.2 % ophthalmic solution, 1 drop    No current facility-administered medications on file prior to visit.       OTC products: none    Allergies   Allergen Reactions     Penicillins Difficulty breathing     And hives     Bee Venom      Ibuprofen Sodium      Percocet [Oxycodone-Acetaminophen]      Latex Allergy: NO    Social History     Socioeconomic History     Marital status: Single     Spouse name: Not on file     Number of children: Not on file     Years of education: Not on file     Highest education level: Not on file   Occupational History     Not on file   Social Needs     Financial resource strain: Not on file     Food insecurity:     Worry: Not on file     Inability: Not on file     Transportation needs:     Medical: Not on file     Non-medical: Not on file   Tobacco Use     Smoking status: Former Smoker     Smokeless tobacco: Never Used   Substance and Sexual Activity     Alcohol use: No     Drug use: No     Sexual activity: Not on file   Lifestyle     Physical activity:     Days per week: Not on file     Minutes per session: Not on file     Stress: Not on file   Relationships     Social connections:     Talks on phone: Not on file     Gets together: Not on file     Attends [employer] service: Not on file     Active member of club or organization: Not on file     Attends meetings of clubs or organizations: Not on file     Relationship status: Not on file     Intimate partner violence:     Fear of current or ex partner: Not on file     Emotionally abused: Not on file     Physically abused: Not on file     Forced sexual activity: Not on file   Other Topics Concern     Parent/sibling w/ CABG, MI or angioplasty before 65F 55M? Not Asked   Social History Narrative     Not on file       REVIEW OF SYSTEMS:   Constitutional, HEENT, cardiovascular, pulmonary, GI, , musculoskeletal, neuro, skin, endocrine and psych systems are negative, except as otherwise noted.    EXAM:     Patient Vitals for the past  "24 hrs:   BP Temp Pulse Resp SpO2 Height Weight   02/03/20 1508 115/79 97.9  F (36.6  C) 81 18 97 % 1.88 m (6' 2\") (!) 155.8 kg (343 lb 6.4 oz)     Body mass index is 44.09 kg/m .  GENERAL: healthy, alert and no distress  EYES: Eyes grossly normal to inspection, extraocular movements - intact, and PERRL  HENT: ear canals- normal; TMs- normal; Nose- normal; Mouth- no ulcers, no lesions  NECK: no tenderness, no adenopathy, no asymmetry, no masses, no stiffness; thyroid- normal to palpation  RESP: lungs clear to auscultation - no rales, no rhonchi, no wheezes  CV: regular rates and rhythm, normal S1 S2, no S3 or S4 and no murmur, no click or rub -  ABDOMEN: soft, no tenderness, no  hepatosplenomegaly, no masses, normal bowel sounds  MS: extremities- no gross deformities noted, no edema  SKIN: no suspicious lesions, no rashes  NEURO: strength and tone- normal, sensory exam- grossly normal, mentation- intact, speech- normal, reflexes- symmetric  BACK: no CVA tenderness, no paralumbar tenderness  PSYCH: Alert and oriented times 3; speech- coherent , normal rate and volume; able to articulate logical thoughts  LYMPHATICS: ant. cervical- normal, post. cervical- normal    DIAGNOSTICS:      No labs or EKG required for low risk surgery (cataract, skin procedure, breast biopsy, etc)    RISK ASSESSMENT:     Cardiovascular Risk:  -Patient is able to do heavy housework without chest pain.  -The patient does not have chest pain with exertion.  -Patient does not have a history of congestive heart failure.    -The patient does not have a history of stroke and does not have a history of valvular disease.    Pulmonary Risk:  -In terms of risk factors for pulmonary complication: See A/P below.     Perioperative Complications:  -The patient does not have a history of bleeding or clotting problems in the past.    -The patient has not had complications from surgeries.    -The patient does not have a family history of any anesthesia or " "surgical complications.      IMPRESSION:   Reason for surgery/procedure: Cataracts    The proposed surgical procedure is considered LOW risk.    For above listed surgery and anesthesia:   Patient is at low risk for surgery/procedure and perioperative/procedure complications.    RECOMMENDATIONS:     Labs:  None    Fasting:  NPO for 8 hours prior to surgery    Preop Plan:  --Approval given to proceed with proposed procedure, without further diagnostic evaluation    Medications:  Patient should hold their regular medications the morning of surgery unless otherwise instructed.      1. Preop general physical exam    2. Cataract of both eyes, unspecified cataract type  The reason for the procedure.    3. Sleep apnea, unspecified type  Not on home CPAP, but will have sleep study soon. Something to be aware of with MAC sedation.    4. Chronic rhinitis  Will trial atrovent nasal spray.   - ipratropium (ATROVENT) 0.06 % nasal spray; Spray 2 sprays into both nostrils 2 times daily  Dispense: 15 mL; Refill: 1    5. Exercise-induced asthma  Will bring albuterol inhaler with to the procedure.     6. Morbid obesity (H)  Monitor for post-op hypoventilation.     7. Anxiety state  Patient states that he will be confused and won't want people \"in my face\" after the procedure. Please be aware of this.     Allen Velarde MD    Please contact our office if there are any further questions or information required about this patient.  "

## 2020-02-03 NOTE — NURSING NOTE
Pre-op faxed to fax number :  [phone]  Location :  [facility]  Date of Surgery :  02/12/20 and 02/24/20  By/Date :  Geetha Troncoso CMA

## 2020-02-03 NOTE — PROGRESS NOTES
Preceptor Attestation:   Patient seen, evaluated and discussed with the resident. I have verified the content of the note, which accurately reflects my assessment of the patient and the plan of care.    Supervising Physician:Eliu Rodarte MD    [facility]

## 2020-02-25 DIAGNOSIS — E78.5 HYPERLIPIDEMIA LDL GOAL <100: ICD-10-CM

## 2020-02-25 RX ORDER — ATORVASTATIN CALCIUM 40 MG/1
40 TABLET, FILM COATED ORAL DAILY
Qty: 90 TABLET | Refills: 1 | Status: SHIPPED | OUTPATIENT
Start: 2020-02-25 | End: 2020-08-13

## 2020-03-01 ENCOUNTER — THERAPY VISIT (OUTPATIENT)
Dept: SLEEP MEDICINE | Facility: CLINIC | Age: 45
End: 2020-03-01
Payer: COMMERCIAL

## 2020-03-01 DIAGNOSIS — G47.10 HYPERSOMNIA: ICD-10-CM

## 2020-03-01 DIAGNOSIS — G47.8 NON-RESTORATIVE SLEEP: ICD-10-CM

## 2020-03-01 DIAGNOSIS — E66.01 MORBID OBESITY (H): ICD-10-CM

## 2020-03-01 DIAGNOSIS — R06.83 SNORING: ICD-10-CM

## 2020-03-01 PROCEDURE — 95810 POLYSOM 6/> YRS 4/> PARAM: CPT | Performed by: INTERNAL MEDICINE

## 2020-03-02 ENCOUNTER — RECORDS - HEALTHEAST (OUTPATIENT)
Dept: ADMINISTRATIVE | Facility: OTHER | Age: 45
End: 2020-03-02

## 2020-03-02 ENCOUNTER — DOCUMENTATION ONLY (OUTPATIENT)
Dept: PSYCHOLOGY | Facility: CLINIC | Age: 45
End: 2020-03-02

## 2020-03-02 ENCOUNTER — OFFICE VISIT (OUTPATIENT)
Dept: FAMILY MEDICINE | Facility: CLINIC | Age: 45
End: 2020-03-02
Payer: COMMERCIAL

## 2020-03-02 VITALS
HEART RATE: 81 BPM | TEMPERATURE: 97.6 F | HEIGHT: 74 IN | WEIGHT: 315 LBS | RESPIRATION RATE: 18 BRPM | SYSTOLIC BLOOD PRESSURE: 147 MMHG | DIASTOLIC BLOOD PRESSURE: 88 MMHG | OXYGEN SATURATION: 96 % | BODY MASS INDEX: 40.43 KG/M2

## 2020-03-02 DIAGNOSIS — J31.0 CHRONIC RHINITIS: ICD-10-CM

## 2020-03-02 DIAGNOSIS — F32.9 MAJOR DEPRESSION, CHRONIC: Primary | ICD-10-CM

## 2020-03-02 DIAGNOSIS — M54.50 CHRONIC BILATERAL LOW BACK PAIN WITHOUT SCIATICA: ICD-10-CM

## 2020-03-02 DIAGNOSIS — G89.29 CHRONIC BILATERAL LOW BACK PAIN WITHOUT SCIATICA: ICD-10-CM

## 2020-03-02 RX ORDER — IPRATROPIUM BROMIDE 42 UG/1
2 SPRAY, METERED NASAL 2 TIMES DAILY
Qty: 15 ML | Refills: 1 | Status: SHIPPED | OUTPATIENT
Start: 2020-03-02 | End: 2020-03-23

## 2020-03-02 RX ORDER — CETIRIZINE HYDROCHLORIDE 10 MG/1
10 TABLET ORAL DAILY
Qty: 30 TABLET | Refills: 0 | Status: SHIPPED | OUTPATIENT
Start: 2020-03-02 | End: 2020-03-24

## 2020-03-02 ASSESSMENT — MIFFLIN-ST. JEOR: SCORE: 2499.46

## 2020-03-02 NOTE — PROGRESS NOTES
Primary Care Behavioral Health Consult Note    Requesting Provider: Dr. Velarde    Identifying Information and Presenting Problem:    Dr. Velarde requested behavioral health consultation for this patient regarding mental health management.      Summary of review:   1. 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 Has struggle with depression, anger management. Has experienced auditory hallucinations and was hospitalized around 1602-2141 for 5 days for desires to harm others via mercury injection. Currently, patient has not shared any specific hallucinations/content of them with Dr. Velarde. He attends appointments regularly and, to our knowledge, has not acted in a way to harm anyone. He is fearful to re-engage with mental health since the last time he was honest about his hallucinations, he was hospitalized.   2. Additionally suffering from other medical concerns that could be impacting overall wellbeing and recently had an appointment with sleep medicine. Has been on disability for about 10 years. Lives with his mother and would like to move out. Unclear how well he'd be able to live with others since being around others agitates him.   3. In the past, has been on a number of psychotropics to help with his symptoms including: Celexa, risperidone, Wellbutrin, Seroquel (had worsening hallucinations), and lamictal.       Assessment:  I have not personally met with or evaluated this patient.  See below for current problem list:    Patient Active Problem List   Diagnosis     Sleep apnea     Anxiety state     Cataract     Major depression, chronic     Other chronic pain     Contact dermatitis and other eczema, due to unspecified cause     Generalized hyperhidrosis     Exercise-induced asthma     Morbid obesity (H)     Peptic ulcer     Scoliosis (and kyphoscoliosis), idiopathic     Vitamin D deficiency     Pain, flank, bilateral      Chronic low back pain     Health Care Home     Knee pain     Allergic to bees     Hyperlipidemia LDL goal <100     Raised intraocular pressure     Thoracic and lumbosacral neuritis     Osteoarthritis of lumbosacral spine without myelopathy     Primary open angle glaucoma of both eyes, mild stage     Deprivation amblyopia of both eyes       PHQ-9 SCORE 12/12/2013 9/18/2019 10/4/2019   PHQ-9 Total Score 15 - -   PHQ-9 Total Score - 16 16       JORDY-7 SCORE 10/4/2019   Total Score 10         Recommendations and Plan:      At this time, patient very unlikely to go or follow regularly with specialty mental health.    Dr. Velarde to continue regular appointments to monitor mood and other medical conditions as well as establish trust and rapport.     Consider referral to Dr. Lozano for assistance with medication management. Will need to be clear to patient this is a 1x option. Eventually, we may be able to get him to see psychiatry regularly, but this will be managed in primary care for now.     Consider also sending epic message to Dr. Lozano prior to appointment further detailing patient's concerns in order to help prepare him for the appointment.     Disclaimer  The above treatment recommendations are based on consultation with the patient's primary care provider and a review of relevant information in EPIC.  I have not personally examined the patient.  All recommendations should be implemented with considerations of the patient's relevant prior history and current clinical status.  Please contact me with any questions about the care of this patient.

## 2020-03-02 NOTE — PROGRESS NOTES
"       HPI:       Adan Rod is a 44 year old  male who presents for follow up of concern(s) listed below    Depression  -Reviewed past notes and it looks like patient has been tried on various anti-depressants in the past, including effexor, trazadone, celexa, wellbutrin, abilify, risperidone, seroquel (possibly had hallucinations while on this). He was put on lamictal, but it looks like patient stopped this  -Has hx of manic/impulsive thoughts of injecting people with mercury while on either trazadone or celexa, and he ended up inpatient for this.   -Since then, he has an extreme distrust for the health system  -After cataract surgery, mood is a lot better, but still   -\"Just trying to make it\"  -Planning to leave in his car soon to get away from his mom  -He is still reluctant to see a psychiatrist    Pain:  -Back pain, really troubling him, has for a long time  -Stopped taking naproxen for the pain because no longer working  -Tried PT in the past, but it aggravated his back really badly, and \"paramedics had to come get me\"  -Has tried various medications, acupuncture, massage  -Likes light exercise    Postnasal drip:  -Talking about how he thought that benadryl and atrovent were \"working against each other\"  -Did do well though, and thinks his breathing is better  -He is allergic to cats, and has two of them, doesn't plan on getting rid of cats  -Doesn't like benadryl as this makes him drowsy           PMHX:     Patient Active Problem List   Diagnosis     Sleep apnea     Anxiety state     Cataract     Major depression, chronic     Other chronic pain     Contact dermatitis and other eczema, due to unspecified cause     Generalized hyperhidrosis     Exercise-induced asthma     Morbid obesity (H)     Peptic ulcer     Scoliosis (and kyphoscoliosis), idiopathic     Vitamin D deficiency     Pain, flank, bilateral     Chronic low back pain     Health Care Home     Knee pain     Allergic to bees     Hyperlipidemia " LDL goal <100     Raised intraocular pressure     Thoracic and lumbosacral neuritis     Osteoarthritis of lumbosacral spine without myelopathy     Primary open angle glaucoma of both eyes, mild stage     Deprivation amblyopia of both eyes       Current Outpatient Medications   Medication Sig Dispense Refill     albuterol (PROAIR HFA) 108 (90 Base) MCG/ACT inhaler Inhale 2 puffs into the lungs every 4 hours as needed for shortness of breath / dyspnea 15 minutes prior to exercise. 18 g 1     atorvastatin (LIPITOR) 40 MG tablet Take 1 tablet (40 mg) by mouth daily 90 tablet 1     brimonidine (ALPHAGAN) 0.2 % ophthalmic solution 1 drop       cetirizine (ZYRTEC) 10 MG tablet Take 1 tablet (10 mg) by mouth daily 30 tablet 0     EPINEPHrine (EPIPEN) 0.3 MG/0.3ML injection Inject 0.3 mLs (0.3 mg) into the muscle as needed for anaphylaxis 0.6 mL 3     ipratropium (ATROVENT) 0.06 % nasal spray Spray 2 sprays into both nostrils 2 times daily 15 mL 1     naproxen (NAPROSYN) 500 MG tablet Take 1 tablet (500 mg) by mouth 2 times daily as needed for moderate pain 90 tablet 3     ranitidine (ZANTAC) 150 MG tablet Take 1 tablet (150 mg) by mouth 2 times daily 120 tablet 3       Social History     Socioeconomic History     Marital status: Single     Spouse name: Not on file     Number of children: Not on file     Years of education: Not on file     Highest education level: Not on file   Occupational History     Not on file   Social Needs     Financial resource strain: Not on file     Food insecurity:     Worry: Not on file     Inability: Not on file     Transportation needs:     Medical: Not on file     Non-medical: Not on file   Tobacco Use     Smoking status: Former Smoker     Smokeless tobacco: Never Used   Substance and Sexual Activity     Alcohol use: No     Drug use: No     Sexual activity: Not on file   Lifestyle     Physical activity:     Days per week: Not on file     Minutes per session: Not on file     Stress: Not on file  "  Relationships     Social connections:     Talks on phone: Not on file     Gets together: Not on file     Attends Zoroastrian service: Not on file     Active member of club or organization: Not on file     Attends meetings of clubs or organizations: Not on file     Relationship status: Not on file     Intimate partner violence:     Fear of current or ex partner: Not on file     Emotionally abused: Not on file     Physically abused: Not on file     Forced sexual activity: Not on file   Other Topics Concern     Parent/sibling w/ CABG, MI or angioplasty before 65F 55M? Not Asked   Social History Narrative     Not on file          Allergies   Allergen Reactions     Penicillins Difficulty breathing     And hives     Bee Venom      Ibuprofen Sodium      Percocet [Oxycodone-Acetaminophen]        Results for orders placed or performed in visit on 03/01/20 (from the past 24 hour(s))   Comprehensive Sleep Study   Result Value Ref Range    SLP Comprehensive Sleep              Review of Systems:     A 10 point review of systems including constitutional, cardiovascular, respiratory, HEENT, GI, , neurological, MSK, skin, endocrine, is negative unless stated in HPI          Physical Exam:     Vitals:    03/02/20 1336   BP: (!) 147/88   Pulse: 81   Resp: 18   Temp: 97.6  F (36.4  C)   TempSrc: Oral   SpO2: 96%   Weight: (!) 155.8 kg (343 lb 6.4 oz)   Height: 1.867 m (6' 1.5\")     Body mass index is 44.69 kg/m .    GENERAL APPEARANCE: healthy, alert and no distress  EYES: Eyes grossly normal to inspection,  PERRL  NEURO: Normal strength and tone, sensory exam grossly normal, mentation appears intact and speech normal  PSYCH: mood and affect normal/bright, somewhat slowed speech    Assessment and Plan     1. Major depression, chronic  As discussed many times prior, long history of depression dating back to 2005 following TBI, but more likely even longstanding as he has struggled with his mom for a while. Still very hesitant in " seeing a psychiatrist because he doesn't want to be put in the hospital. I will consult with Dr. Mckeon to see if there would be a good medication to start. He enjoys seeing me monthly, and we will continue to do that.     2. Chronic bilateral low back pain without sciatica  Unable to find records of prior injection and unable to find MRI spine results, but there was one done in 2008. I will refer to spine with the hopes that he could have another injection as this was helpful. He sees the connection between exercise and remittance of his back pain. His only problems come when his back is too painful to get out of bed. We will continue naproxen, and can add on tylenol, and he is okay with this. Reviewed records and he has tried every other back-specific medication.  - [facility] REFERRAL; Future    3. Chronic rhinitis  Stop benadryl as this is causing him sedation. Start zyrtec and continue atrovent. This will continue to be an issue as he is allergic to cats.   - cetirizine (ZYRTEC) 10 MG tablet; Take 1 tablet (10 mg) by mouth daily  Dispense: 30 tablet; Refill: 0  - ipratropium (ATROVENT) 0.06 % nasal spray; Spray 2 sprays into both nostrils 2 times daily  Dispense: 15 mL; Refill: 1        Options for treatment and follow-up care were reviewed with the patient and/or guardian. Adan Rod and/or guardian engaged in the decision making process and verbalized understanding of the options discussed and agreed with the final plan.    Allen Velarde MD  [facility] Resident, PGY2      Precepted today with: Rebekah Del Angel MD

## 2020-03-02 NOTE — PATIENT INSTRUCTIONS
Referral for :     Spine care    LOCATION/PLACE/Provider :    [facility]   DATE & TIME :    Faxed referral, location will call   PHONE :     [phone]  FAX :    [phone]  Appointment made by clinic staff/:    [place]

## 2020-03-05 ENCOUNTER — AMBULATORY - HEALTHEAST (OUTPATIENT)
Dept: SLEEP MEDICINE | Facility: CLINIC | Age: 45
End: 2020-03-05

## 2020-03-05 ENCOUNTER — COMMUNICATION - HEALTHEAST (OUTPATIENT)
Dept: SLEEP MEDICINE | Facility: CLINIC | Age: 45
End: 2020-03-05

## 2020-03-06 LAB — SLPCOMP: NORMAL

## 2020-03-19 ENCOUNTER — OFFICE VISIT - HEALTHEAST (OUTPATIENT)
Dept: SLEEP MEDICINE | Facility: CLINIC | Age: 45
End: 2020-03-19

## 2020-03-19 ENCOUNTER — COMMUNICATION - HEALTHEAST (OUTPATIENT)
Dept: TELEHEALTH | Facility: CLINIC | Age: 45
End: 2020-03-19

## 2020-03-19 DIAGNOSIS — G47.10 HYPERSOMNIA: ICD-10-CM

## 2020-03-19 DIAGNOSIS — G47.33 OBSTRUCTIVE SLEEP APNEA: ICD-10-CM

## 2020-03-19 DIAGNOSIS — J31.0 CHRONIC RHINITIS: ICD-10-CM

## 2020-03-23 RX ORDER — IPRATROPIUM BROMIDE 42 UG/1
SPRAY, METERED NASAL
Qty: 15 ML | Refills: 3 | Status: SHIPPED | OUTPATIENT
Start: 2020-03-23 | End: 2023-05-31

## 2020-03-24 DIAGNOSIS — J31.0 CHRONIC RHINITIS: ICD-10-CM

## 2020-03-24 RX ORDER — CETIRIZINE HYDROCHLORIDE 10 MG/1
10 TABLET ORAL DAILY
Qty: 30 TABLET | Refills: 3 | Status: SHIPPED | OUTPATIENT
Start: 2020-03-24 | End: 2020-04-07

## 2020-04-02 ENCOUNTER — TELEPHONE (OUTPATIENT)
Dept: FAMILY MEDICINE | Facility: CLINIC | Age: 45
End: 2020-04-02

## 2020-04-02 NOTE — TELEPHONE ENCOUNTER
Called patient and left detailed message on voicemail informing patient that [facility] is still conducting visits with our patients and that we are also offering telephone visits instead of coming into clinic and if patient has concerns for anything we are still here and can call during normal business hours. Left our clinic information as well.

## 2020-04-07 ENCOUNTER — VIRTUAL VISIT (OUTPATIENT)
Dept: FAMILY MEDICINE | Facility: CLINIC | Age: 45
End: 2020-04-07
Payer: COMMERCIAL

## 2020-04-07 VITALS — WEIGHT: 315 LBS | HEIGHT: 74 IN | BODY MASS INDEX: 40.43 KG/M2

## 2020-04-07 DIAGNOSIS — F32.9 MAJOR DEPRESSION, CHRONIC: Primary | ICD-10-CM

## 2020-04-07 DIAGNOSIS — J31.0 CHRONIC RHINITIS: ICD-10-CM

## 2020-04-07 DIAGNOSIS — G47.30 SLEEP APNEA, UNSPECIFIED TYPE: ICD-10-CM

## 2020-04-07 DIAGNOSIS — M54.50 CHRONIC LOW BACK PAIN, UNSPECIFIED BACK PAIN LATERALITY, UNSPECIFIED WHETHER SCIATICA PRESENT: ICD-10-CM

## 2020-04-07 DIAGNOSIS — G89.29 CHRONIC LOW BACK PAIN, UNSPECIFIED BACK PAIN LATERALITY, UNSPECIFIED WHETHER SCIATICA PRESENT: ICD-10-CM

## 2020-04-07 RX ORDER — LURASIDONE HYDROCHLORIDE 20 MG/1
20 TABLET, FILM COATED ORAL AT BEDTIME
Qty: 14 TABLET | Refills: 0 | Status: SHIPPED | OUTPATIENT
Start: 2020-04-07 | End: 2020-06-17

## 2020-04-07 RX ORDER — CETIRIZINE HYDROCHLORIDE 5 MG/1
5 TABLET ORAL 2 TIMES DAILY
Qty: 60 TABLET | Refills: 1 | Status: SHIPPED | OUTPATIENT
Start: 2020-04-07 | End: 2020-05-12

## 2020-04-07 ASSESSMENT — MIFFLIN-ST. JEOR: SCORE: 2505.59

## 2020-04-07 NOTE — PROGRESS NOTES
"Family Medicine Telephone Visit Note           Telephone Visit Consent   Patient was verbally read the following and verbal consent was obtained.  \"I understand that I may revoke this request for a phone visit at any time.  This consent will automatically  3 months from the signed date and time.\"    Name person giving consent:  Patient   Date verbal consent given:  2020  Time verbal consent given:  9:00 AM          No chief complaint on file.    Current Outpatient Medications   Medication Sig Dispense Refill     albuterol (PROAIR HFA) 108 (90 Base) MCG/ACT inhaler Inhale 2 puffs into the lungs every 4 hours as needed for shortness of breath / dyspnea 15 minutes prior to exercise. 18 g 1     atorvastatin (LIPITOR) 40 MG tablet Take 1 tablet (40 mg) by mouth daily 90 tablet 1     brimonidine (ALPHAGAN) 0.2 % ophthalmic solution 1 drop       cetirizine (ZYRTEC) 10 MG tablet Take 1 tablet (10 mg) by mouth daily 30 tablet 3     EPINEPHrine (EPIPEN) 0.3 MG/0.3ML injection Inject 0.3 mLs (0.3 mg) into the muscle as needed for anaphylaxis 0.6 mL 3     ipratropium (ATROVENT) 0.06 % nasal spray USE 2 SPRAYS IN BOTH NOSTRILS TWICE A DAY 15 mL 3     naproxen (NAPROSYN) 500 MG tablet Take 1 tablet (500 mg) by mouth 2 times daily as needed for moderate pain 90 tablet 3     ranitidine (ZANTAC) 150 MG tablet Take 1 tablet (150 mg) by mouth 2 times daily 120 tablet 3     Allergies   Allergen Reactions     Penicillins Difficulty breathing     And hives     Bee Venom      Ibuprofen Sodium      Percocet [Oxycodone-Acetaminophen]                    HPI   Patients name: Dain Rod   Appointment start time:  9:09 AM    Mood:  -Depressed mood, especially with COVID-19  -Same issues of being in close proximity to his mom  -Feels extremely tired throughout the day    BITA:  -Got approved for CPAP!  -Waiting on equipment to come in    Low back pain:  -Not well managed  -Unfortunately, was not able to get into the spine center " because of COVID-19    Rhinitis:  -Zyrtec works well, but wears off by the evening so he has been supplementing with benadryl  -Atrovent is working          Assessment and Plan   1. Major depression, chronic  Still reluctant to talk to a psychiatrist. I have consulted with psychiatry resident, and will trial starting dose of latuda and see back in 1 week over the phone. Discussed medication side effects, and he has had many unwanted side effects in the past to medications, so we will really need to monitor him closely.   - lurasidone (LATUDA) 20 MG TABS tablet; Take 1 tablet (20 mg) by mouth At Bedtime  Dispense: 14 tablet; Refill: 0    2. Sleep apnea, unspecified type  Waiting on CPAP supplies. I hope this will help his mood and make him feel less tired.     3. Chronic low back pain, unspecified back pain laterality, unspecified whether sciatica present  At a stand still unfortunately. Discussed keeping moving until we can get him into the spine center.     4. Chronic rhinitis  Will try dividing the dose so that he can take BID.   - cetirizine (ZYRTEC) 5 MG tablet; Take 1 tablet (5 mg) by mouth 2 times daily  Dispense: 60 tablet; Refill: 1      Refilled medications that would be required in the next 3 months.     After Visit Information:  Will print and mail AVS     Appointment end time: 9:37 AM  This is a telephone visit that took 28 minutes.      Clinician location:  [place]    ELSA Crump MD  [facility] Resident, PGY2

## 2020-04-10 NOTE — PROGRESS NOTES
Preceptor Attestation:  I discussed the patient with the resident. I have verified the content of the note, which accurately reflects my assessment of the patient and the plan of care.   Supervising Physician:  Tamiko Ferguson MD.

## 2020-05-12 DIAGNOSIS — J31.0 CHRONIC RHINITIS: ICD-10-CM

## 2020-05-12 RX ORDER — CETIRIZINE HYDROCHLORIDE 5 MG/1
5 TABLET ORAL 2 TIMES DAILY
Qty: 60 TABLET | Refills: 3 | Status: SHIPPED | OUTPATIENT
Start: 2020-05-12 | End: 2023-03-28

## 2020-05-19 ENCOUNTER — TELEPHONE (OUTPATIENT)
Dept: FAMILY MEDICINE | Facility: CLINIC | Age: 45
End: 2020-05-19

## 2020-05-19 DIAGNOSIS — J31.0 CHRONIC RHINITIS: Primary | ICD-10-CM

## 2020-05-19 RX ORDER — DIPHENHYDRAMINE HCL 25 MG
25 CAPSULE ORAL
Qty: 30 CAPSULE | Refills: 0 | Status: SHIPPED | OUTPATIENT
Start: 2020-05-19 | End: 2020-06-17

## 2020-05-19 NOTE — TELEPHONE ENCOUNTER
----- Message from Allen Velarde MD sent at 5/19/2020  7:55 AM CDT -----  Regarding: RE: Banophen 25 mg  He isn't supposed to be taking it, but I will fill 30 day supply, and can we get a phone visit sometime in the next 30 days? Thanks.  ----- Message -----  From: Ethan Castillo [facility]  Sent: 5/18/2020   2:53 PM CDT  To: Allen Velarde MD  Subject: Banophen 25 mg                                   Hi Dr. Velarde, pt is requesting Rx for Banophen 25 mg and see that it's not on active list anymore. Noticed that you sent Zyrtec 5 mg BID last week. Is he suppose to take both?. Pt said he's taking at night and zyrtec in day. Please verify for refills. Thanks

## 2020-06-17 ENCOUNTER — VIRTUAL VISIT (OUTPATIENT)
Dept: FAMILY MEDICINE | Facility: CLINIC | Age: 45
End: 2020-06-17
Payer: COMMERCIAL

## 2020-06-17 DIAGNOSIS — Z78.9 ALCOHOL USE: ICD-10-CM

## 2020-06-17 DIAGNOSIS — J31.0 CHRONIC RHINITIS: Primary | ICD-10-CM

## 2020-06-17 DIAGNOSIS — F32.9 MAJOR DEPRESSION, CHRONIC: ICD-10-CM

## 2020-06-17 DIAGNOSIS — K21.9 GASTROESOPHAGEAL REFLUX DISEASE WITHOUT ESOPHAGITIS: ICD-10-CM

## 2020-06-17 PROBLEM — F10.90 ALCOHOL USE: Status: ACTIVE | Noted: 2020-06-17

## 2020-06-17 RX ORDER — FAMOTIDINE 20 MG/1
20 TABLET, FILM COATED ORAL 2 TIMES DAILY
Qty: 60 TABLET | Refills: 3 | Status: SHIPPED | OUTPATIENT
Start: 2020-06-17 | End: 2020-11-08

## 2020-06-17 NOTE — PROGRESS NOTES
"Family Medicine Video Visit Note  Dain Rod Sr is being evaluated via a billable video visit.               Video Visit Consent     Patient was verbally read the following and verbal consent was obtained.  \"Video visits are billed at different rates depending on your insurance coverage. During this emergency period, for some insurers they may be billed the same as an in-person visit.  Please reach out to your insurance provider with any questions.  If during the course of the call the physician/provider feels a video visit is not appropriate, you will not be charged for this service.\"     (Name person giving consent:  Patient   Date verbal consent given:  6/17/2020  Time verbal consent given:  1:14 PM)    Patient would like the video invitation sent by: Text to cell phone: [phone]             Chief Complaint   Patient presents with     Allergies     Having some sinus issue and HA      Medication Reconciliation             HPI     Video Start Time: 1:32 PM    Dain Rod Sr presents to clinic today for the following health issues:    Depression:  -Recent death of uncle  -Tried Latuda, bad headache, so stopped after 2 weeks  -Lots more angry too  -Started drinking again too after 10 years of sobriety, mom is very annoying to him. This started about one month ago.   -Sleep cycle is off  -Drinking about 1L of alcohol per day now, getting angry, using alcohol to cope  -Thinks he needs to leave to quit drinking, and he has his car packed now. Thinks he will leave at the beginning of next month  -Starts every morning when he wakes up with a drink  -Has never had withdrawals before or since drinking started  -Doesn't foresee quitting until he is out of the house    Allergies/chronic rhinitis:  -Taking zyrtec in the morning and benadryl at night  -Benadryl is not sedating him    GERD:  -wants to switch from ranitidine to famotidine       Current Outpatient Medications   Medication Sig Dispense Refill     " "albuterol (PROAIR HFA) 108 (90 Base) MCG/ACT inhaler Inhale 2 puffs into the lungs every 4 hours as needed for shortness of breath / dyspnea 15 minutes prior to exercise. 18 g 1     atorvastatin (LIPITOR) 40 MG tablet Take 1 tablet (40 mg) by mouth daily 90 tablet 1     brimonidine (ALPHAGAN) 0.2 % ophthalmic solution 1 drop       cetirizine (ZYRTEC) 5 MG tablet Take 1 tablet (5 mg) by mouth 2 times daily 60 tablet 3     diphenhydrAMINE (BENADRYL) 25 MG capsule Take 1 capsule (25 mg) by mouth nightly as needed for itching or allergies 30 capsule 0     EPINEPHrine (EPIPEN) 0.3 MG/0.3ML injection Inject 0.3 mLs (0.3 mg) into the muscle as needed for anaphylaxis 0.6 mL 3     ipratropium (ATROVENT) 0.06 % nasal spray USE 2 SPRAYS IN BOTH NOSTRILS TWICE A DAY 15 mL 3     naproxen (NAPROSYN) 500 MG tablet Take 1 tablet (500 mg) by mouth 2 times daily as needed for moderate pain 90 tablet 3     ranitidine (ZANTAC) 150 MG tablet Take 1 tablet (150 mg) by mouth 2 times daily 120 tablet 3     Allergies   Allergen Reactions     Penicillins Difficulty breathing     And hives     Bee Venom      Ibuprofen Sodium      Percocet [Oxycodone-Acetaminophen]               Review of Systems:     A 10 point review of systems including constitutional, cardiovascular, respiratory, HEENT, GI, , neurological, MSK, skin, endocrine, is negative unless stated in HPI           Physical Exam:     There were no vitals taken for this visit.  Estimated body mass index is 44.04 kg/m  as calculated from the following:    Height as of 4/7/20: 1.88 m (6' 2\").    Weight as of 4/7/20: 155.6 kg (343 lb).    GENERAL: alert, talkative  EYES: Eyes grossly normal to inspection.  No discharge or erythema, or obvious scleral/conjunctival abnormalities.  RESP: No audible wheeze, cough, or visible cyanosis.  No visible retractions or increased work of breathing.    SKIN: Visible skin clear. No significant rash, abnormal pigmentation or lesions.  NEURO: Cranial " nerves grossly intact.  Mentation and speech appropriate for age.  PSYCH: Appears mildly intoxicated, but mentation is intact          Assessment and Plan     1. Chronic rhinitis  Stop benadryl as this is likely messing a little with his sleep cycle. Transition to zyrtec 5 mg BID. Follow up over the phone next week.    2. Major depression, chronic  3. Alcohol use  Longstanding in the setting of TBI in 2005. Now with ongoing, daily alcohol use. Spent a significant amount of time discussing this recent relapse, and he has no thoughts of quitting anytime soon, until he moves out of his house (he talks about leaving very frequently but never has). I do worry about alcohol withdrawal if he did decide to quit, so I was very explicit that he would need to let us know if he were going to do this. He is very reticent to go to the hospital if he needed to. He is really wanting a medication to help with sleep. Discussed sleep hygiene, and will also start amitriptyline given history of TBI, headaches, difficulty sleeping. I will check in with him in one week.   - amitriptyline (ELAVIL) 25 MG tablet; Take 1 tablet (25 mg) by mouth At Bedtime  Dispense: 10 tablet; Refill: 0    4. Gastroesophageal reflux disease without esophagitis  Switching away from ranitidine.   - famotidine (PEPCID) 20 MG tablet; Take 1 tablet (20 mg) by mouth 2 times daily  Dispense: 60 tablet; Refill: 3      Refilled medications that would be required in the next 3 months.     After Visit Information:  Will print and mail AVS       No follow-ups on file.      Video-Visit Details    Type of service:  Video Visit    Video End Time (time video stopped): 2:17 PM    Originating Location (pt. Location): Home    Distant Location (provider location):  [facility]     Mode of Communication:  Video Conference via [facility]      Allen Velarde MD  I precepted today with Dr. Rodarte

## 2020-06-23 ENCOUNTER — TELEPHONE (OUTPATIENT)
Dept: FAMILY MEDICINE | Facility: CLINIC | Age: 45
End: 2020-06-23

## 2020-06-23 NOTE — PROGRESS NOTES
Preceptor Attestation:  I discussed the patient with the resident and spoke with the patient over the video feed. I have verified the content of the note, which accurately reflects my assessment of the patient and the plan of care.  Supervising Physician:Eliu Rodarte MD  [facility]

## 2020-06-23 NOTE — TELEPHONE ENCOUNTER
I called patient to see if he was able to fill prescription. He states [facility] stated med was on back order? He was unable to fill. I canceled and sent to [facility] across the street from clinic. Patient states that he quit drinking 5 days ago and didn't experience any withdrawal symptoms. He looks forward to being sober again and realizes that his ~1 month of alcohol use was a small hiccup in the road of sobriety.     Allen Velarde MD  [place] [facility] Resident, PGY2

## 2020-06-29 ENCOUNTER — VIRTUAL VISIT (OUTPATIENT)
Dept: FAMILY MEDICINE | Facility: CLINIC | Age: 45
End: 2020-06-29
Payer: COMMERCIAL

## 2020-06-29 DIAGNOSIS — F32.9 MAJOR DEPRESSION, CHRONIC: Primary | ICD-10-CM

## 2020-06-29 NOTE — PROGRESS NOTES
"Family Medicine Video Visit Note  Dain Rod Sr is being evaluated via a billable video visit.               Video Visit Consent     Patient was verbally read the following and verbal consent was obtained.  \"Video visits are billed at different rates depending on your insurance coverage. During this emergency period, for some insurers they may be billed the same as an in-person visit.  Please reach out to your insurance provider with any questions.  If during the course of the call the physician/provider feels a video visit is not appropriate, you will not be charged for this service.\"     (Name person giving consent:  Patient   Date verbal consent given:  6/29/2020  Time verbal consent given:  11:43 AM)    Patient would like the video invitation sent by: Text to cell phone: [phone]             Chief Complaint   Patient presents with     RECHECK     new medication            HPI       Video Start Time: 12:06 PM    Dain Rod Sr presents to clinic today for the following health issues:    From last week: \"I called patient to see if he was able to fill prescription. He states [facility] stated med was on back order? He was unable to fill. I canceled and sent to [facility] across the street from clinic. Patient states that he quit drinking 5 days ago and didn't experience any withdrawal symptoms. He looks forward to being sober again and realizes that his ~1 month of alcohol use was a small hiccup in the road of sobriety.\"  -Amitriptyline started three nights ago, nothing has really changed yet, wanting to increase dose if not working at 25 mg      Current Outpatient Medications   Medication Sig Dispense Refill     albuterol (PROAIR HFA) 108 (90 Base) MCG/ACT inhaler Inhale 2 puffs into the lungs every 4 hours as needed for shortness of breath / dyspnea 15 minutes prior to exercise. 18 g 1     amitriptyline (ELAVIL) 25 MG tablet Take 1 tablet (25 mg) by mouth At Bedtime 10 tablet 0     atorvastatin (LIPITOR) 40 MG " "tablet Take 1 tablet (40 mg) by mouth daily 90 tablet 1     brimonidine (ALPHAGAN) 0.2 % ophthalmic solution 1 drop       cetirizine (ZYRTEC) 5 MG tablet Take 1 tablet (5 mg) by mouth 2 times daily 60 tablet 3     EPINEPHrine (EPIPEN) 0.3 MG/0.3ML injection Inject 0.3 mLs (0.3 mg) into the muscle as needed for anaphylaxis 0.6 mL 3     famotidine (PEPCID) 20 MG tablet Take 1 tablet (20 mg) by mouth 2 times daily 60 tablet 3     ipratropium (ATROVENT) 0.06 % nasal spray USE 2 SPRAYS IN BOTH NOSTRILS TWICE A DAY 15 mL 3     naproxen (NAPROSYN) 500 MG tablet Take 1 tablet (500 mg) by mouth 2 times daily as needed for moderate pain 90 tablet 3     Allergies   Allergen Reactions     Penicillins Difficulty breathing     And hives     Bee Venom      Ibuprofen Sodium      Percocet [Oxycodone-Acetaminophen]               Review of Systems:     A 10 point review of systems including constitutional, cardiovascular, respiratory, HEENT, GI, , neurological, MSK, skin, endocrine, is negative unless stated in HPI         Physical Exam:     There were no vitals taken for this visit.  Estimated body mass index is 44.04 kg/m  as calculated from the following:    Height as of 4/7/20: 1.88 m (6' 2\").    Weight as of 4/7/20: 155.6 kg (343 lb).    GENERAL: Healthy, alert and no distress  EYES: Eyes grossly normal to inspection.  No discharge or erythema, or obvious scleral/conjunctival abnormalities.  RESP: No audible wheeze, cough, or visible cyanosis.  No visible retractions or increased work of breathing.    SKIN: Visible skin clear. No significant rash, abnormal pigmentation or lesions.  NEURO: Cranial nerves grossly intact.  Mentation and speech appropriate for age.  PSYCH: Mentation appears normal, affect normal/bright, judgement and insight intact, normal speech and appearance well-groomed.          Assessment and Plan   1. Major depression, chronic  Remains sober. Dad out of the hospital. Both of these things are making his mood " better. Not feeling much of an effect of medication yet. Will uptitrate to 50 mg if no effect after 7 days. Follow up 7/8.   - amitriptyline (ELAVIL) 25 MG tablet; Continue 25 mg until you have reached 7 nights of taking the medication. If you are not feeling better, start taking 50 mg nightly.  Dispense: 30 tablet; Refill: 0    Refilled medications that would be required in the next 3 months.     After Visit Information:  Will print and mail AVS       No follow-ups on file.      Video-Visit Details    Type of service:  Video Visit    Video End Time (time video stopped): 12:20 PM    Originating Location (pt. Location): Home    Distant Location (provider location):  [facility]     Mode of Communication:  Video Conference via [facility]      Allen Velarde MD  I precepted today with Dr. Ferguson

## 2020-06-30 NOTE — PROGRESS NOTES
Preceptor Attestation:    I talked to the patient on the phone and discussed the patient with the resident. I have verified the content of the note, which accurately reflects my assessment of the patient and the plan of care.   Supervising Physician:  Tamiko Ferguson MD.

## 2020-07-08 ENCOUNTER — VIRTUAL VISIT (OUTPATIENT)
Dept: FAMILY MEDICINE | Facility: CLINIC | Age: 45
End: 2020-07-08
Payer: COMMERCIAL

## 2020-07-08 DIAGNOSIS — F32.9 MAJOR DEPRESSION, CHRONIC: Primary | ICD-10-CM

## 2020-07-08 NOTE — PROGRESS NOTES
"Family Medicine Video Visit Note  Dain Rod Sr is being evaluated via a billable video visit.               Video Visit Consent     Patient was verbally read the following and verbal consent was obtained.  \"Video visits are billed at different rates depending on your insurance coverage. During this emergency period, for some insurers they may be billed the same as an in-person visit.  Please reach out to your insurance provider with any questions.  If during the course of the call the physician/provider feels a video visit is not appropriate, you will not be charged for this service.\"     (Name person giving consent:  Patient   Date verbal consent given:  7/8/2020  Time verbal consent given:  11:17 AM)    Patient would like the video invitation sent by: Text to cell phone: [phone]        Chief Complaint   Patient presents with     RECHECK     Medication Reconciliation     Completed             HPI     Video Start Time: 11:41 AM    Dain Rod Sr presents to clinic today for the following health issues:    Depression:  -From last visit: \"1. Major depression, chronic  Remains sober. Dad out of the hospital. Both of these things are making his mood better. Not feeling much of an effect of medication yet. Will uptitrate to 50 mg if no effect after 7 days. Follow up 7/8.   - amitriptyline (ELAVIL) 25 MG tablet; Continue 25 mg until you have reached 7 nights of taking the medication. If you are not feeling better, start taking 50 mg nightly.  Dispense: 30 tablet; Refill: 0\"  -Decided to keep the dose at 25 mg, because he was a little hesitant to increase  -Noticed a lot of decrease in anxiety and feeling angry since starting  -Gave mom a 30-day notice    Current Outpatient Medications   Medication Sig Dispense Refill     albuterol (PROAIR HFA) 108 (90 Base) MCG/ACT inhaler Inhale 2 puffs into the lungs every 4 hours as needed for shortness of breath / dyspnea 15 minutes prior to exercise. 18 g 1     " "amitriptyline (ELAVIL) 25 MG tablet Continue 25 mg until you have reached 7 nights of taking the medication. If you are not feeling better, start taking 50 mg nightly. 30 tablet 0     atorvastatin (LIPITOR) 40 MG tablet Take 1 tablet (40 mg) by mouth daily 90 tablet 1     brimonidine (ALPHAGAN) 0.2 % ophthalmic solution 1 drop       cetirizine (ZYRTEC) 5 MG tablet Take 1 tablet (5 mg) by mouth 2 times daily 60 tablet 3     EPINEPHrine (EPIPEN) 0.3 MG/0.3ML injection Inject 0.3 mLs (0.3 mg) into the muscle as needed for anaphylaxis 0.6 mL 3     famotidine (PEPCID) 20 MG tablet Take 1 tablet (20 mg) by mouth 2 times daily 60 tablet 3     ipratropium (ATROVENT) 0.06 % nasal spray USE 2 SPRAYS IN BOTH NOSTRILS TWICE A DAY 15 mL 3     naproxen (NAPROSYN) 500 MG tablet Take 1 tablet (500 mg) by mouth 2 times daily as needed for moderate pain 90 tablet 3     Allergies   Allergen Reactions     Penicillins Difficulty breathing     And hives     Bee Venom      Ibuprofen Sodium      Percocet [Oxycodone-Acetaminophen]               Review of Systems:     A 10 point review of systems including constitutional, cardiovascular, respiratory, HEENT, GI, , neurological, MSK, skin, endocrine, is negative unless stated in HPI         Physical Exam:     There were no vitals taken for this visit.  Estimated body mass index is 44.04 kg/m  as calculated from the following:    Height as of 4/7/20: 1.88 m (6' 2\").    Weight as of 4/7/20: 155.6 kg (343 lb).    GENERAL: Healthy, alert and no distress  EYES: Eyes grossly normal to inspection.  No discharge or erythema, or obvious scleral/conjunctival abnormalities.  RESP: No audible wheeze, cough, or visible cyanosis.  No visible retractions or increased work of breathing.    SKIN: Visible skin clear. No significant rash, abnormal pigmentation or lesions.  NEURO: Cranial nerves grossly intact.  Mentation and speech appropriate for age.  PSYCH: Mentation appears normal, affect normal/bright, " judgement and insight intact, normal speech and appearance well-groomed.          Assessment and Plan     1. Major depression, chronic  Mood pretty well controlled on TCA at very low dose. Getting rid of anger. Still feeling tired, and feeling down. Will increase dose and check in in one week. Lots of room to go up further.   - amitriptyline (ELAVIL) 25 MG tablet; Take 2 tablets (50 mg) by mouth At Bedtime  Dispense: 60 tablet; Refill: 0    Refilled medications that would be required in the next 3 months.     After Visit Information:  Will print and mail AVS       No follow-ups on file.      Video-Visit Details    Type of service:  Video Visit    Video End Time (time video stopped): 11:55 AM    Originating Location (pt. Location): Home    Distant Location (provider location):  [facility]     Platform used for Video Visit: Sanket Velarde MD  I precepted today with Dr. Westfall

## 2020-07-15 NOTE — PROGRESS NOTES
Preceptor Attestation:  Patient's case reviewed and discussed with Allen Velarde MD resident and I evaluated the patient. I agree with written assessment and plan of care.  Supervising Physician:  Arian Westfall MD, MD [facility]  [facility]

## 2020-08-13 DIAGNOSIS — E78.5 HYPERLIPIDEMIA LDL GOAL <100: ICD-10-CM

## 2020-08-13 RX ORDER — ATORVASTATIN CALCIUM 40 MG/1
40 TABLET, FILM COATED ORAL DAILY
Qty: 90 TABLET | Refills: 1 | Status: SHIPPED | OUTPATIENT
Start: 2020-08-13 | End: 2023-03-28 | Stop reason: DRUGHIGH

## 2020-10-27 NOTE — PATIENT INSTRUCTIONS
[facility]    1. Your sleep study will be reviewed by a sleep physician within the next few days.     2. Please follow up in the sleep clinic as scheduled, or, make an appointment with your sleep provider to be seen within two weeks to discuss the results of the sleep study.    3. If you have any questions or problems with your treatment plan, please contact your sleep clinic provider at [phone] to further manage your condition.    4. Please review your attached medication list, and, at your follow-up appointment advise your sleep clinic provider about any changes.    5. Go to [email or web address] for more information about your sleep problems.    TUNDE Reeves  March 2, 2020        
Class I (easy) - visualization of the soft palate, fauces, uvula, and both anterior and posterior pillars

## 2020-11-04 DIAGNOSIS — K21.9 GASTROESOPHAGEAL REFLUX DISEASE WITHOUT ESOPHAGITIS: ICD-10-CM

## 2020-11-08 RX ORDER — FAMOTIDINE 40 MG/1
TABLET, FILM COATED ORAL
Qty: 90 TABLET | Refills: 1 | Status: SHIPPED | OUTPATIENT
Start: 2020-11-08 | End: 2023-03-28 | Stop reason: DRUGHIGH

## 2020-12-06 ENCOUNTER — HEALTH MAINTENANCE LETTER (OUTPATIENT)
Age: 45
End: 2020-12-06

## 2021-04-27 ENCOUNTER — VIRTUAL VISIT (OUTPATIENT)
Dept: FAMILY MEDICINE | Facility: CLINIC | Age: 46
End: 2021-04-27
Payer: COMMERCIAL

## 2021-04-27 VITALS
SYSTOLIC BLOOD PRESSURE: 136 MMHG | RESPIRATION RATE: 18 BRPM | HEIGHT: 74 IN | TEMPERATURE: 98.2 F | HEART RATE: 79 BPM | OXYGEN SATURATION: 97 % | DIASTOLIC BLOOD PRESSURE: 86 MMHG | BODY MASS INDEX: 40.43 KG/M2 | WEIGHT: 315 LBS

## 2021-04-27 DIAGNOSIS — R10.2 SUPRAPUBIC PRESSURE: Primary | ICD-10-CM

## 2021-04-27 DIAGNOSIS — R35.0 URINARY FREQUENCY: ICD-10-CM

## 2021-04-27 DIAGNOSIS — G89.29 CHRONIC BILATERAL LOW BACK PAIN WITHOUT SCIATICA: ICD-10-CM

## 2021-04-27 DIAGNOSIS — M54.50 CHRONIC BILATERAL LOW BACK PAIN WITHOUT SCIATICA: ICD-10-CM

## 2021-04-27 LAB
BILIRUBIN UR: NEGATIVE MG/DL
BLOOD UR: NEGATIVE MG/DL
CLARITY, URINE: CLEAR
COLOR UR: YELLOW
GLUCOSE URINE: NEGATIVE
KETONES UR QL: NEGATIVE MG/DL
LEUKOCYTE ESTERASE UR: NEGATIVE
NITRITE UR QL STRIP: NEGATIVE MG/DL
PH UR STRIP: 5.5 [PH] (ref 4.5–8)
PROTEIN UR: NEGATIVE MG/DL
SP GR UR STRIP: 1.02 (ref 1–1.03)
UROBILINOGEN UR STRIP-ACNC: NORMAL E.U./DL

## 2021-04-27 PROCEDURE — 99214 OFFICE O/P EST MOD 30 MIN: CPT | Mod: 95 | Performed by: STUDENT IN AN ORGANIZED HEALTH CARE EDUCATION/TRAINING PROGRAM

## 2021-04-27 PROCEDURE — 81003 URINALYSIS AUTO W/O SCOPE: CPT | Performed by: STUDENT IN AN ORGANIZED HEALTH CARE EDUCATION/TRAINING PROGRAM

## 2021-04-27 ASSESSMENT — MIFFLIN-ST. JEOR: SCORE: 2617.06
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
## 2021-04-28 ENCOUNTER — RECORDS - HEALTHEAST (OUTPATIENT)
Dept: ADMINISTRATIVE | Facility: OTHER | Age: 46
End: 2021-04-28

## 2021-04-28 NOTE — RESULT ENCOUNTER NOTE
Results discussed directly with patient while patient was present. Any further details documented in the note.   Jimenez Deleon MD

## 2021-04-28 NOTE — PATIENT INSTRUCTIONS
Referral for :     CT Abdomen pelvis  LOCATION/PLACE/Provider :    [facility]   DATE & TIME :    referral faxed, location will reach out.   PHONE :     [phone]  FAX :    [phone]  Appointment made by clinic staff/:    [facility]

## 2021-04-29 DIAGNOSIS — R30.9 URINARY PAIN: ICD-10-CM

## 2021-04-29 LAB
% IMMATURE GRANULOCYTES: 0 % (ref 0–0)
ABS IMMATURE GRANULOCYTES: 0 10E9/L (ref 0–0)
ALBUMIN SERPL-MCNC: 4 G/DL (ref 3.3–4.6)
ALP SERPL-CCNC: 70 U/L (ref 40–150)
ALT SERPL-CCNC: 52 U/L (ref 0–45)
AST SERPL-CCNC: 40 U/L (ref 0–55)
BASOPHILS # BLD AUTO: 0.1 10E9/L (ref 0–0.2)
BASOPHILS NFR BLD AUTO: 1 % (ref 0–2)
BILIRUB SERPL-MCNC: 0.8 MG/DL (ref 0.2–1.3)
BUN SERPL-MCNC: 13 MG/DL (ref 5–24)
CALCIUM SERPL-MCNC: 10.2 MG/DL (ref 8.5–10.4)
CHLORIDE SERPLBLD-SCNC: 105 MMOL/L (ref 94–109)
CO2 SERPL-SCNC: 28 MMOL/L (ref 20–32)
CREAT SERPL-MCNC: 1.2 MG/DL (ref 0.8–1.5)
EGFR CALCULATED (NON BLACK REFERENCE): 69 ML/MIN
EOSINOPHIL # BLD AUTO: 0.1 10E9/L (ref 0–0.7)
EOSINOPHIL NFR BLD AUTO: 1 % (ref 0–6)
ERYTHROCYTE [DISTWIDTH] IN BLOOD BY AUTOMATED COUNT: 11.5 % (ref 10–15)
GLUCOSE SERPL-MCNC: 107 MG/DL (ref 60–109)
HCT VFR BLD AUTO: 45.9 % (ref 40–53)
HEMOGLOBIN: 15.5 G/DL (ref 13.3–17.7)
LYMPHOCYTES # BLD AUTO: 2.4 10E9/L (ref 0.8–5.3)
LYMPHOCYTES NFR BLD AUTO: 42.7 % (ref 20–48)
MCH RBC QN AUTO: 31 PG (ref 26.5–35)
MCHC RBC AUTO-ENTMCNC: 33.8 G/DL (ref 32–36)
MCV RBC AUTO: 91.8 FL (ref 78–100)
MONOCYTES # BLD AUTO: 0.4 10E9/L (ref 0–1.3)
MONOCYTES NFR BLD AUTO: 8 % (ref 0–12)
NEUTROPHILS # BLD AUTO: 2.6 10E9/L (ref 1.6–8.3)
NEUTROPHILS NFR BLD AUTO: 47.8 % (ref 40–75)
PLATELET # BLD AUTO: 299 10E9/L (ref 150–450)
POTASSIUM SERPL-SCNC: 4.1 MMOL/L (ref 3.4–5.3)
PROT SERPL-MCNC: 7.9 G/DL (ref 6.6–8.8)
RBC # BLD AUTO: 5 10E12/L (ref 4.4–5.9)
SODIUM SERPL-SCNC: 141 MMOL/L (ref 133–144)
WBC # BLD AUTO: 5.5 10E9/L (ref 4–11)

## 2021-04-29 PROCEDURE — 85025 COMPLETE CBC W/AUTO DIFF WBC: CPT

## 2021-04-29 PROCEDURE — 80053 COMPREHEN METABOLIC PANEL: CPT

## 2021-04-29 PROCEDURE — 36415 COLL VENOUS BLD VENIPUNCTURE: CPT

## 2021-04-29 NOTE — LETTER
May 6, 2021      Adan Rod  [address] [place] [address]        Dear ,    We are writing to inform you of your test results.    Adan - your liver, kidney function looks pretty good.  Your blood counts are normal.  Still waiting on your prostate lab.    Resulted Orders   CBC with Diff Plt ([facility])   Result Value Ref Range    WBC 5.5 4.0 - 11.0 10e9/L    RBC 5.00 4.40 - 5.90 10e12/L    Hemoglobin 15.5 13.3 - 17.7 g/dL    Hematocrit 45.9 40.0 - 53.0 %    MCV 91.8 78.0 - 100.0 fL    MCH 31.0 26.5 - 35.0 pg    MCHC 33.8 32.0 - 36.0 g/dL    RDW 11.5 10.0 - 15.0 %    Platelets 299.0 150.0 - 450.0 10e9/L    % Neutrophils 47.8 40.0 - 75.0 %    % Monocytes 8 0 - 12 %    % Lymphocytes 42.7 20.0 - 48.0 %    % Eosinophils 1 0 - 6 %    % Basophils 1 0 - 2 %    % Immature Granulocytes 0 0 - 0 %    Absolute Neutrophil 2.6 1.6 - 8.3 10e9/L    Absolute Monocytes 0.4 0.0 - 1.3 10e9/L    Lymphocytes # 2.4 0.8 - 5.3 10e9/L    Absolute Eosinophils 0.1 0.0 - 0.7 10e9/L    Absolute Basophils 0.1 0.0 - 0.2 10e9/L    Abs Immature Granulocytes 0.0 0.0 - 0.0 10e9/L   Comprehensive Metabolic Panel ([facility]) - results <1hr Protocol   Result Value Ref Range    Glucose 107.0 60.0 - 109.0 mg/dL    Urea Nitrogen 13.0 5.0 - 24.0 mg/dL    Creatinine 1.2 0.8 - 1.5 mg/dL    Sodium 141.0 133.0 - 144.0 mmol/L    Potassium 4.1 3.4 - 5.3 mmol/L    Chloride 105.0 94.0 - 109.0 mmol/L    Carbon Dioxide 28.0 20.0 - 32.0 mmol/L    Calcium 10.2 8.5 - 10.4 mg/dL    Protein Total 7.9 6.6 - 8.8 g/dL    Albumin 4.0 3.3 - 4.6 g/dL    Alkaline Phosphatase 70.0 40.0 - 150.0 U/L    ALT 52.0 (H) 0.0 - 45.0 U/L    AST 40.0 0.0 - 55.0 U/L    Bilirubin Total 0.8 0.2 - 1.3 mg/dL    eGFR Calculated (Non Black Reference) 69.0 >60.0 mL/min      Comment:      eGFR is calculated by the CKD-EPI creatinine equation, without race   adjustment. eGFR can be influenced by muscle mass, exercise, and diet. The   reported eGFR is an estimation only and is  only applicable if the renal   function is stable.         If you have any questions or concerns, please call the clinic at the number listed above.       Sincerely,      Jimenez Deleon MD

## 2021-05-05 NOTE — RESULT ENCOUNTER NOTE
"Please send the following in a letter to the patient:     \"Adan - your liver, kidney function looks pretty good.  Your blood counts are normal.  Still waiting on your prostate lab.\""

## 2021-05-06 DIAGNOSIS — R30.9 URINARY PAIN: ICD-10-CM

## 2021-05-06 LAB — PSA SERPL-MCNC: 0.5 NG/ML (ref 0–2.5)

## 2021-05-06 PROCEDURE — 36415 COLL VENOUS BLD VENIPUNCTURE: CPT

## 2021-05-08 ENCOUNTER — HOSPITAL ENCOUNTER (OUTPATIENT)
Dept: CT IMAGING | Facility: CLINIC | Age: 46
Discharge: HOME | End: 2021-05-08
Payer: COMMERCIAL

## 2021-05-08 DIAGNOSIS — R30.9 URINARY PAIN: ICD-10-CM

## 2021-05-13 ENCOUNTER — VIRTUAL VISIT (OUTPATIENT)
Dept: FAMILY MEDICINE | Facility: CLINIC | Age: 46
End: 2021-05-13
Payer: COMMERCIAL

## 2021-05-13 DIAGNOSIS — R39.9 LOWER URINARY TRACT SYMPTOMS (LUTS): ICD-10-CM

## 2021-05-13 DIAGNOSIS — R31.0 GROSS HEMATURIA: ICD-10-CM

## 2021-05-13 DIAGNOSIS — G89.29 CHRONIC BILATERAL LOW BACK PAIN WITHOUT SCIATICA: ICD-10-CM

## 2021-05-13 DIAGNOSIS — R36.1 HEMATOSPERMIA: ICD-10-CM

## 2021-05-13 DIAGNOSIS — M54.50 CHRONIC BILATERAL LOW BACK PAIN WITHOUT SCIATICA: ICD-10-CM

## 2021-05-13 DIAGNOSIS — M54.50 CHRONIC BILATERAL LOW BACK PAIN WITHOUT SCIATICA: Primary | ICD-10-CM

## 2021-05-13 DIAGNOSIS — G89.29 CHRONIC BILATERAL LOW BACK PAIN WITHOUT SCIATICA: Primary | ICD-10-CM

## 2021-05-13 PROCEDURE — 99214 OFFICE O/P EST MOD 30 MIN: CPT | Mod: 95 | Performed by: STUDENT IN AN ORGANIZED HEALTH CARE EDUCATION/TRAINING PROGRAM

## 2021-05-13 RX ORDER — METHOCARBAMOL 500 MG/1
500 TABLET, FILM COATED ORAL 3 TIMES DAILY PRN
Qty: 90 TABLET | Refills: 0 | Status: SHIPPED | OUTPATIENT
Start: 2021-05-13 | End: 2021-05-17

## 2021-05-13 NOTE — PROGRESS NOTES
"Family Medicine Telephone Visit Note      Chief Complaint   Patient presents with     Results     review ct scans     Medication Reconciliation     complete            HPI   Patients name: Dain Rod Sr  Appointment start time:  9:45 AM    1) LUTS f/u  -6 months ago started  -intermittent episodes of hematuria, hematospermia, urinary frequency, urinary hesitancy  -Noticed blood in \"piss\" after waiting to urinate for too long  -noticed in semen as well  -intermittent penile pain and suprapubic pain  -denies fever, chills, nausea, vomiting  -last visit -> unable to perform MALIKA before patient left    2) back pain  -multiple years of lower back pain  -over past several months -> worsened  -improves with ambulation  -trouble walking due to pain  -no urinary or stool incontinence, leg weakness, leg numbness      Current Outpatient Medications   Medication Sig Dispense Refill     albuterol (PROAIR HFA) 108 (90 Base) MCG/ACT inhaler Inhale 2 puffs into the lungs every 4 hours as needed for shortness of breath / dyspnea 15 minutes prior to exercise. 18 g 1     amitriptyline (ELAVIL) 25 MG tablet Take 2 tablets (50 mg) by mouth At Bedtime 60 tablet 0     atorvastatin (LIPITOR) 40 MG tablet Take 1 tablet (40 mg) by mouth daily 90 tablet 1     brimonidine (ALPHAGAN) 0.2 % ophthalmic solution 1 drop       cetirizine (ZYRTEC) 5 MG tablet Take 1 tablet (5 mg) by mouth 2 times daily 60 tablet 3     EPINEPHrine (EPIPEN) 0.3 MG/0.3ML injection Inject 0.3 mLs (0.3 mg) into the muscle as needed for anaphylaxis 0.6 mL 3     famotidine (PEPCID) 40 MG tablet TAKE 1/2 TABLET BY MOUTH TWICE A DAY 90 tablet 1     ipratropium (ATROVENT) 0.06 % nasal spray USE 2 SPRAYS IN BOTH NOSTRILS TWICE A DAY 15 mL 3     naproxen (NAPROSYN) 500 MG tablet Take 1 tablet (500 mg) by mouth 2 times daily as needed for moderate pain 90 tablet 3     Allergies   Allergen Reactions     Penicillins Difficulty breathing     And hives     Bee Venom      Ibuprofen " "Sodium      Percocet [Oxycodone-Acetaminophen]             Review of Systems:     6-point ROS reviewed and negative unless otherwise noted in HPI         Physical Exam:     There were no vitals taken for this visit.  Estimated body mass index is 47.99 kg/m  as calculated from the following:    Height as of 4/27/21: 1.87 m (6' 1.62\").    Weight as of 4/27/21: 167.8 kg (370 lb).    Exam:  Constitutional: healthy, alert and no distress        Assessment and Plan     (M54.5,  G89.29) Chronic bilateral low back pain without sciatica  (primary encounter diagnosis)  Comment: Chronic pain that has exacerbated recently.  Dealing with stress at home taking care of loved one.  No red flag symptoms, so imaging not needed at this point.  Patient has PT exercises.  Discussed trial of muscle relaxant.  Plan:   -methocarbamol (ROBAXIN) 500 MG tablet  -Continue APAP and PT home exercises  -RTC in 4 weeks    (R31.0) Gross hematuria  (R36.1) Hematospermia  (R39.9) LUTS  Comment: Chronic intermittent episodes of LUTS with blood in urine and semen.  VS from previous visit were WNL.  Patient has had weight gain, but no unexpected weight loss.  No unexplained constitutional symptoms.  Kidney stone ruled out with CT.  UTI, urethritis, acute prostatitis, epididymitis, orchitis are unlikely given chronic nature and unremarkable urine and lab results.  Although no microscopic hematuria was detected, urological cancer not completely ruled out.  In addition, chronic prostatitis is a possibility.  Will send to Urology for considering further interventions.   Plan:   -UROLOGY ADULT REFERRAL    After Visit Information:  Patient chose to view AVS via HistoryFile    No follow-ups on file.    Appointment end time: 10:10 AM  This is a telephone visit that took 24 minutes.      Clinician location:  [facility]     Jimenez Deleon MD  I precepted today with Dr. Rodarte.    "

## 2021-05-17 PROBLEM — R36.1 HEMATOSPERMIA: Status: ACTIVE | Noted: 2021-05-17

## 2021-05-17 PROBLEM — R31.0 GROSS HEMATURIA: Status: ACTIVE | Noted: 2021-05-17

## 2021-05-17 PROBLEM — R39.9 LOWER URINARY TRACT SYMPTOMS (LUTS): Status: ACTIVE | Noted: 2021-05-17

## 2021-05-17 RX ORDER — TIZANIDINE 2 MG/1
2 TABLET ORAL 3 TIMES DAILY PRN
Qty: 45 TABLET | Refills: 1 | Status: SHIPPED | OUTPATIENT
Start: 2021-05-17 | End: 2021-06-06

## 2021-05-17 NOTE — PATIENT INSTRUCTIONS
Referral for :     Urology    LOCATION/PLACE/Provider :    [facility]   DATE & TIME :    Faxed   PHONE :     [phone]  FAX :    [phone]  Appointment made by clinic staff/:    C[facility]

## 2021-05-18 ENCOUNTER — TELEPHONE (OUTPATIENT)
Dept: FAMILY MEDICINE | Facility: CLINIC | Age: 46
End: 2021-05-18

## 2021-05-18 NOTE — TELEPHONE ENCOUNTER
----- Message from Jimenez Deleon MD sent at 5/17/2021  8:34 AM CDT -----  Regarding: Needs follow up please  Hey all,    This patient needs to follow up with Dr. Velarde or another yellow team provider in 4 weeks for chronic back pain.  Could someone give him a call for this?    Thanks!  -Dr. Deleon

## 2021-06-03 ENCOUNTER — TRANSFERRED RECORDS (OUTPATIENT)
Dept: HEALTH INFORMATION MANAGEMENT | Facility: CLINIC | Age: 46
End: 2021-06-03

## 2021-06-04 ENCOUNTER — RECORDS - HEALTHEAST (OUTPATIENT)
Dept: ADMINISTRATIVE | Facility: CLINIC | Age: 46
End: 2021-06-04

## 2021-06-04 VITALS
BODY MASS INDEX: 40.43 KG/M2 | WEIGHT: 315 LBS | SYSTOLIC BLOOD PRESSURE: 138 MMHG | HEART RATE: 88 BPM | DIASTOLIC BLOOD PRESSURE: 83 MMHG | OXYGEN SATURATION: 94 % | HEIGHT: 74 IN
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
## 2021-06-06 NOTE — TELEPHONE ENCOUNTER
Please mail a letter with a copy of the patient's sleep study encouraging the patient to call us to schedule a follow up visit to review the results in person. Thank you.

## 2021-06-06 NOTE — TELEPHONE ENCOUNTER
Letter/sleep study report have been mailed out today 3/5/2020      Janny ENG CMA, SLEEP MEDICINE, 3/5/2020 1:37 PM
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
## 2021-06-07 NOTE — PROGRESS NOTES
Adan is in the process of moving and does not want to start on the machine until this is done. He will give us a call when he is ready to start on CPAP.

## 2021-06-07 NOTE — PROGRESS NOTES
Order for Durable Medical Equipment was processed and equipment ordered.     [facility]    Date Faxed: 3/19/2020    Ordering Provider: Lauro Pascual DO    PAP Order Type: New Device Order    Fax Number: Sent to [place]: [facility]

## 2021-06-14 NOTE — PROGRESS NOTES
"       HPI:       Adan Rod is a 46 year old  male who presents for follow up of concern(s) listed below    States he is here to follow-up CT scan that he had in the beginning of May.  We reviewed this in detail.  We also reviewed his recent urology visit, which showed that he has prostatic varices, and this is the reason for his hematuria.  Patient tells me in regards to his back pain that he had a back injection at one time that was really helpful.  He gets 6 months of relief from it.  He feels like a lot of his current weight issues are related to his back pain, as he is not able to move his much.  He also notices that he is having a lot of stress secondary to caring for his father who has complex medical issues.  Overall, this all worsens his depression.  He has tried numerous medications in the past, none of which have been good for doing.  He has had a lot of side effects, and has had homicidal thoughts at times.  His knees have been bothering him a lot.  He is not doing anything specifically as far as therapy goes for his knees.  As a reminder, he has received cortisone injections in the past, as well as hyaluronic acid injections.  He realizes that his weight plays a role in his knee pain, and that his mood, weight, and pain are all interconnected.  He tried methocarbamol for his pain, but it was not helpful.  Tylenol is not helpful as well.  Naproxen does help, and he states he has been taking 6 x 500 mg daily when his pain is really bad.  We discussed that this is too much to be taking on a daily basis.  He is self-medicating with THC, which can be helpful for him at times.    From last visit:  \"(M54.5,  G89.29) Chronic bilateral low back pain without sciatica  (primary encounter diagnosis)  Comment: Chronic pain that has exacerbated recently.  Dealing with stress at home taking care of loved one.  No red flag symptoms, so imaging not needed at this point.  Patient has PT exercises.  Discussed trial " "of muscle relaxant.  Plan:   -methocarbamol (ROBAXIN) 500 MG tablet  -Continue APAP and PT home exercises  -RTC in 4 weeks     (R31.0) Gross hematuria  (R36.1) Hematospermia  (R39.9) LUTS  Comment: Chronic intermittent episodes of LUTS with blood in urine and semen.  VS from previous visit were WNL.  Patient has had weight gain, but no unexpected weight loss.  No unexplained constitutional symptoms.  Kidney stone ruled out with CT.  UTI, urethritis, acute prostatitis, epididymitis, orchitis are unlikely given chronic nature and unremarkable urine and lab results.  Although no microscopic hematuria was detected, urological cancer not completely ruled out.  In addition, chronic prostatitis is a possibility.  Will send to Urology for considering further interventions.   Plan:   -UROLOGY ADULT REFERRAL\"           PMHX:     Patient Active Problem List   Diagnosis     Sleep apnea     Anxiety state     Cataract     Major depression, chronic     Other chronic pain     Contact dermatitis and other eczema, due to unspecified cause     Generalized hyperhidrosis     Exercise-induced asthma     Morbid obesity (H)     Peptic ulcer     Scoliosis (and kyphoscoliosis), idiopathic     Vitamin D deficiency     Pain, flank, bilateral     Chronic low back pain     Health Care Home     Chronic rhinitis     Primary osteoarthritis of both knees     Allergic to bees     Hyperlipidemia LDL goal <100     Raised intraocular pressure     Thoracic and lumbosacral neuritis     Osteoarthritis of lumbosacral spine without myelopathy     Primary open angle glaucoma of both eyes, mild stage     Deprivation amblyopia of both eyes     Alcohol use     Gross hematuria     Hematospermia     Lower urinary tract symptoms (LUTS)     Prostate varices       Current Outpatient Medications   Medication Sig Dispense Refill     albuterol (PROAIR HFA) 108 (90 Base) MCG/ACT inhaler Inhale 2 puffs into the lungs every 4 hours as needed for shortness of breath / " dyspnea 15 minutes prior to exercise. 18 g 1     atorvastatin (LIPITOR) 40 MG tablet Take 1 tablet (40 mg) by mouth daily 90 tablet 1     brimonidine (ALPHAGAN) 0.2 % ophthalmic solution 1 drop       cetirizine (ZYRTEC) 5 MG tablet Take 1 tablet (5 mg) by mouth 2 times daily 60 tablet 3     EPINEPHrine (EPIPEN) 0.3 MG/0.3ML injection Inject 0.3 mLs (0.3 mg) into the muscle as needed for anaphylaxis 0.6 mL 3     famotidine (PEPCID) 40 MG tablet TAKE 1/2 TABLET BY MOUTH TWICE A DAY 90 tablet 1     ipratropium (ATROVENT) 0.06 % nasal spray USE 2 SPRAYS IN BOTH NOSTRILS TWICE A DAY 15 mL 3     naproxen (NAPROSYN) 500 MG tablet Take 1 tablet (500 mg) by mouth 2 times daily as needed for moderate pain 90 tablet 3     tiZANidine (ZANAFLEX) 2 MG tablet Take 2 tablets (4 mg) by mouth 3 times daily as needed for muscle spasms 90 tablet 1       Social History     Socioeconomic History     Marital status:      Spouse name: Not on file     Number of children: Not on file     Years of education: Not on file     Highest education level: Not on file   Occupational History     Not on file   Social Needs     Financial resource strain: Not on file     Food insecurity     Worry: Not on file     Inability: Not on file     Transportation needs     Medical: Not on file     Non-medical: Not on file   Tobacco Use     Smoking status: Former Smoker     Smokeless tobacco: Never Used   Substance and Sexual Activity     Alcohol use: No     Drug use: No     Sexual activity: Not on file   Lifestyle     Physical activity     Days per week: Not on file     Minutes per session: Not on file     Stress: Not on file   Relationships     Social connections     Talks on phone: Not on file     Gets together: Not on file     Attends Faith service: Not on file     Active member of club or organization: Not on file     Attends meetings of clubs or organizations: Not on file     Relationship status: Not on file     Intimate partner violence     Fear  "of current or ex partner: Not on file     Emotionally abused: Not on file     Physically abused: Not on file     Forced sexual activity: Not on file   Other Topics Concern     Parent/sibling w/ CABG, MI or angioplasty before 65F 55M? Not Asked   Social History Narrative     Not on file          Allergies   Allergen Reactions     Penicillins Difficulty breathing     And hives     Bee Venom      Ibuprofen Sodium      Percocet [Oxycodone-Acetaminophen]        No results found for this or any previous visit (from the past 24 hour(s)).         Review of Systems:     A 10 point review of systems including constitutional, cardiovascular, respiratory, HEENT, GI, , neurological, MSK, skin, endocrine, is negative unless stated in HPI          Physical Exam:     Vitals:    06/15/21 1006   BP: 128/79   Pulse: 93   Resp: 22   Temp: 99.1  F (37.3  C)   SpO2: 95%   Weight: (!) 169.2 kg (373 lb)   Height: 1.88 m (6' 2\")     Body mass index is 47.89 kg/m .    GENERAL APPEARANCE: healthy, alert and no distress  MS: extremities normal- no gross deformities noted  NEURO: Normal strength and tone, sensory exam grossly normal, mentation appears intact and speech normal  PSYCH: mood and affect normal/bright      Assessment and Plan     1. Chronic low back pain, unspecified back pain laterality, unspecified whether sciatica present  CT reviewed, shows mild arthritis.  He has benefited from spinal injections in the past.  Order placed at this time.  Certainly, patient's weight is playing a role, which we discussed today.  The goal will be to get his back pain under control so that he can do more exercise and lose weight.  Compounding factor of having to help his dad for many hours of the day, which makes caring for himself more difficult.  - Orthopedic & Spine  Referral; Future    2. Prostate varices  Reviewed his urology visit with the patient.  Added to his problem list.    3. Primary osteoarthritis of both knees  Again, " reviewed x-rays from 2017 that show bilateral osteoarthritis.  He has tried cortisone injections, and hyaluronic acid injections in the past.  I gave him home exercises to work on today.  We discussed that once he gets his back injection, doing these exercises should be a little easier.  Can consider trialing corticosteroid injections again at a follow-up visit in 1 month.    4. Major depression, chronic  Symptoms recently exacerbated by weight gain of 30 pounds since the pandemic started.  In addition, he feels like he is caring for his dad more than he is caring for himself.  He is not interested in therapy at this time.  He is worried about starting a medication as he has had so many side effects in the past.    5. Morbid obesity (H)  Discussed in detail with the weight, depression, pain are all interrelated, which patient understands.  Plan to work on back pain first so that he can then do his exercises that he normally would do at the gym.  Consider bariatric referral in the future, although, patient quite busy at this time and not interested.      Options for treatment and follow-up care were reviewed with the patient and/or guardian. Adan Rod and/or guardian engaged in the decision making process and verbalized understanding of the options discussed and agreed with the final plan.    Allen Velarde MD  [facility] Resident, PGY3      Precepted today with: Dr. Casimiro MD    Preceptor Attestation:   Patient seen, evaluated and discussed with the resident. I have verified the content of the note, which accurately reflects my assessment of the patient and the plan of care.  Supervising Physician:Earnest Kirkpatrick MD  [facility]

## 2021-06-15 ENCOUNTER — OFFICE VISIT (OUTPATIENT)
Dept: FAMILY MEDICINE | Facility: CLINIC | Age: 46
End: 2021-06-15
Payer: COMMERCIAL

## 2021-06-15 VITALS
OXYGEN SATURATION: 95 % | HEART RATE: 93 BPM | SYSTOLIC BLOOD PRESSURE: 128 MMHG | HEIGHT: 74 IN | WEIGHT: 315 LBS | BODY MASS INDEX: 40.43 KG/M2 | RESPIRATION RATE: 22 BRPM | DIASTOLIC BLOOD PRESSURE: 79 MMHG | TEMPERATURE: 99.1 F

## 2021-06-15 DIAGNOSIS — M54.50 CHRONIC LOW BACK PAIN, UNSPECIFIED BACK PAIN LATERALITY, UNSPECIFIED WHETHER SCIATICA PRESENT: Primary | ICD-10-CM

## 2021-06-15 DIAGNOSIS — M17.0 PRIMARY OSTEOARTHRITIS OF BOTH KNEES: ICD-10-CM

## 2021-06-15 DIAGNOSIS — G89.29 CHRONIC LOW BACK PAIN, UNSPECIFIED BACK PAIN LATERALITY, UNSPECIFIED WHETHER SCIATICA PRESENT: Primary | ICD-10-CM

## 2021-06-15 DIAGNOSIS — E66.01 MORBID OBESITY (H): ICD-10-CM

## 2021-06-15 DIAGNOSIS — F32.9 MAJOR DEPRESSION, CHRONIC: ICD-10-CM

## 2021-06-15 DIAGNOSIS — I86.8 PROSTATE VARICES: ICD-10-CM

## 2021-06-15 PROCEDURE — 99214 OFFICE O/P EST MOD 30 MIN: CPT | Mod: GC | Performed by: STUDENT IN AN ORGANIZED HEALTH CARE EDUCATION/TRAINING PROGRAM

## 2021-06-15 ASSESSMENT — MIFFLIN-ST. JEOR: SCORE: 2631.67

## 2021-06-16 ENCOUNTER — RECORDS - HEALTHEAST (OUTPATIENT)
Dept: ADMINISTRATIVE | Facility: OTHER | Age: 46
End: 2021-06-16

## 2021-06-16 ENCOUNTER — RECORDS - HEALTHEAST (OUTPATIENT)
Dept: PHYSICAL MEDICINE AND REHAB | Facility: CLINIC | Age: 46
End: 2021-06-16

## 2021-06-16 DIAGNOSIS — M54.50 CHRONIC LOW BACK PAIN, UNSPECIFIED BACK PAIN LATERALITY, UNSPECIFIED WHETHER SCIATICA PRESENT: ICD-10-CM

## 2021-06-16 DIAGNOSIS — G89.29 CHRONIC LOW BACK PAIN, UNSPECIFIED BACK PAIN LATERALITY, UNSPECIFIED WHETHER SCIATICA PRESENT: ICD-10-CM

## 2021-06-17 NOTE — PATIENT INSTRUCTIONS - HE
Patient Instructions by Lauro Pascual DO at 12/12/2019 11:10 AM     Author: Lauro Pascual DO Service: -- Author Type: Physician    Filed: 12/12/2019 11:18 AM Encounter Date: 12/12/2019 Status: Signed    : Lauro Pascual DO (Physician)         Patient education: What is a sleep study?     What is a sleep study? -- A sleep study is a test that measures how well you sleep and checks for sleep problems. For some sleep studies, you stay overnight in a sleep lab at a hospital or sleep center.     What happens during a sleep study? -- Before you go to sleep, a technician attaches small, sticky patches called electrodes to your head, chest, and legs. He or she will also place a small tube beneath your nose and might wrap 1 or 2 belts around your chest.   Each of these items has wires that connect to monitors. The monitors record your movement, brain activity, breathing, and other body functions while you sleep.  If you have a history of trouble falling asleep, your doctor might prescribe a medicine to help you fall asleep in the lab. If you have never taken the medicine before, your doctor might ask you take it on a night before your sleep study to see how it affects you.   Why might my doctor order a sleep study? -- Your doctor will order a sleep study if he or she thinks you have sleep apnea or a different condition that makes you:   ?Have sudden jerking leg movements while you sleep, called periodic limb movements.   ?Feel very sleepy during the day and fall asleep all of a sudden, called narcolepsy.   ?Have trouble falling asleep or staying asleep over a long period of time, called chronic insomnia.   ?Do odd things while you sleep, such as walking.  How should I prepare for a sleep study? -- On the day of your sleep study, you should:   ?Avoid alcohol   ?Avoid drinking coffee, tea, sodas, and other drinks that have caffeine in the afternoon and evening   ?Take all of your regular medicines    The cost  of care estimate line is [phone]. They are able to give the patient an estimate of the charges and also an estimate of their insurance coverage/patient responsibility.  After your sleep study is performed, please call us at [phone] to schedule for a follow up to review the results of the sleep study.    Please bring one tab of low dose melatonin 3 mg or less to the night of the study.    Melatonin intake is completely voluntary.    You may take own melatonin after arrival to sleep center. Do not drive or operate machinery after intake of melatonin.

## 2021-06-17 NOTE — PROGRESS NOTES
Addressed in [facility] EHR.  No further action required in this EHR.    Jimenez Deleon MD  [facility] Resident, PGY3

## 2021-06-20 NOTE — LETTER
Letter by Lauro Pascual DO at      Author: Lauro Pascual DO Service: -- Author Type: --    Filed:  Encounter Date: 3/5/2020 Status: (Other)       Adanuriel Rod  [address] [place] [address]       March 5, 2020      Dear Mr. Rod,      Enclosed is the report from your recent sleep study.     Please call [phone] to schedule follow up visit if you would like to review these results in person with Dr. Pascual. We are scheduling out approximately 2-3 months. If you would like to be seen sooner, please ask to be added to our wait/cancellation list.           Sincerely,      Janny ENG [facility]  Sleep Medicine  3/5/2020 1:34 PM    C/O      Lauro Pascual DO

## 2021-06-28 NOTE — PROGRESS NOTES
"Progress Notes by Lauro Pascual DO at 3/5/2020 12:55 PM     Author: Lauro Pascual DO Service: -- Author Type: Physician    Filed: 3/5/2020 12:56 PM Encounter Date: 3/5/2020 Status: Signed    : Lauro Pascual DO (Physician)        SLEEP STUDY INTERPRETATION  DIAGNOSTIC POLYSOMNOGRAPHY REPORT      Patient: OZZY SANDERS  YOB: 1975  Study Date: 3/1/2020  MRN: [id number]  Referring Provider: Allen Velarde Md  Ordering Provider: Lauro Pascual DO    Indications for Polysomnography: The patient is a 44 y old Male who is 6' 2\" and weighs 351.0 lbs. His BMI is 45.0, [place] sleepiness scale 13.0 and neck circumference is 49.5 cm. Relevant medical history includes (low back pain). A diagnostic polysomnogram was performed to evaluate for excessive daytime sleepiness that has been going on for more than 1 year.    Polysomnogram Data: A full night polysomnogram recorded the standard physiologic parameters including EEG, EOG, EMG, ECG, nasal and oral airflow. Respiratory parameters of chest and abdominal movements were recorded with respiratory inductance plethysmography. Oxygen saturation was recorded by pulse oximetry. Hypopnea scoring rule used: 1B 4%.    Sleep Architecture: The patients sleep architecture was mildly fragmented with increased arousal index.  The total recording time of the polysomnogram was 440.3 minutes. The total sleep time was 314.0 minutes. Sleep latency was normal at 21.9 minutes with the use of a sleep aid (melatonin 3 mg orally). REM latency was 133.5 minutes. Arousal index was mildly increased at 16.1 arousals per hour. Sleep efficiency was normal at 71.3%. Wake after sleep onset was 104.0 minutes. The patient spent 9.6% of total sleep time in Stage N1, 64.8% in Stage N2, 9.7% in Stage N3, and 15.9% in REM. Time in REM supine was 6.0 minutes.    Respiration: The patient was found to have mild obstructive sleep apnea with the apnea hypopnea index elevated at 11.5 " events per hour with the lowest O2 Sat of 83%. The patients respiratory events were worst in supine position.    Events ? The polysomnogram revealed a presence of 10 obstructive, 3 central, and 0 mixed apneas resulting in an apnea index of 2.5 events per hour. There were 47 obstructive hypopneas and 0 central hypopneas resulting in an obstructive hypopnea index of 9.0 and central hypopnea index of - events per hour. The combined apnea/hypopnea index was 11.5 events per hour (central apnea/hypopnea index was 0.6 events per hour). The REM AHI was 10.8 events per hour. The supine AHI was 19.6 events per hour. The RERA index was 1.7 events per hour.  The RDI was 13.2 events per hour.    Snoring - was reported as loud    Respiratory rate and pattern - was notable for mild obstructive sleep apnea.    Sleep Associated Hypoxia - (Greater than 5 minutes O2 sat at or below 88%) was not present. Baseline oxygen saturation was 93.6%. Lowest oxygen saturation was 83.0%. Time spent less than or equal to 88% was 1.4 minutes. Time spent less than or equal to 89% was 2.8 minutes.    Movement Activity: Within normal limits    Periodic Limb Activity - There were 0 PLMs during the entire study. The PLM index was 0 movements per hour. The PLM Arousal Index was - per hour.    REM EMG Activity - Excessive  muscle activity was not present.    Bruxism - None apparent.    Cardiac Summary: No ventricular arrhythmias appreciated.  The average pulse rate was 76.7 bpm. The minimum pulse rate was 61.2 bpm while the maximum pulse rate was 103.2 bpm.  Arrhythmias were not noted.      Assessment:     The patients sleep architecture was mildly fragmented with increased arousal index.    The patient was found to have mild obstructive sleep apnea with the apnea hypopnea index elevated at 11.5 events per hour with the lowest O2 Sat of 83%. The patients respiratory events were worst in supine position.    Recommendations:  .    CPAP or oral appliance may  be indicated due to the severity of sleep disordered breathing    Consider weight reduction management as this may be a factor in BITA    Suggest optimizing sleep hygiene and avoiding any further sleep deprivation.    Measures aimed at increasing sleep consolidation can be beneficial.    Diagnostic Codes:   Obstructive Sleep Apnea G47.33    3/1/2020 [facility] Sleep Study (351.0 lbs) - AHI 11.5, RDI 13.2, Supine AHI 19.6, REM AHI 10.8, Low O2 83.0%, Time Spent ?88% 1.4 minutes / Time Spent ?89% 2.8 minutes.     _____________________________________   Electronically Signed By: Lauro Pascual DO 03/05/2020

## 2021-06-30 ENCOUNTER — COMMUNICATION - HEALTHEAST (OUTPATIENT)
Dept: PHYSICAL MEDICINE AND REHAB | Facility: CLINIC | Age: 46
End: 2021-06-30

## 2021-07-22 NOTE — LETTER
Letter by Emma Nguyen at      Author: Emma Nguyen Service: -- Author Type: --    Filed:  Encounter Date: 6/30/2021 Status: (Other)         Dear Adan,    At [facility], we care about your health and well-being. Recently, we received a  referral to get you scheduled at the [facility]. We have attempted to  assist you in scheduling this appointment and have been unsuccessful in reaching you.    Please call our Intake line at your earliest convenience for assistance in scheduling an  appointment. Thank you for choosing [facility].    Sincerely,    [facility] Intake team  [phone]

## 2021-09-02 NOTE — TELEPHONE ENCOUNTER
Message to physician: ranitidine    Date of last visit: 1/6/2020     Date of next visit if scheduled: Visit date not found       Last Comprehensive Metabolic Panel:  Sodium   Date Value Ref Range Status   10/25/2019 143.0 133.0 - 144.0 mmol/L Final     Potassium   Date Value Ref Range Status   10/25/2019 4.0 3.4 - 5.3 mmol/L Final     Chloride   Date Value Ref Range Status   10/25/2019 101.0 94.0 - 109.0 mmol/L Final     Carbon Dioxide   Date Value Ref Range Status   10/25/2019 26.0 20.0 - 32.0 mmol/L Final     Anion Gap   Date Value Ref Range Status   12/02/2008 10 6 - 17 mmol/L Final     Glucose   Date Value Ref Range Status   10/25/2019 97.0 60.0 - 109.0 mg/dL Final     Urea Nitrogen   Date Value Ref Range Status   10/25/2019 12.0 5.0 - 24.0 mg/dL Final     Creatinine   Date Value Ref Range Status   10/25/2019 1.1 0.8 - 1.5 mg/dL Final     GFR Estimate   Date Value Ref Range Status   08/12/2013 > 60 >60 ml/min/1.73m2 Final     Calcium   Date Value Ref Range Status   10/25/2019 9.6 8.5 - 10.4 mg/dL Final       BP Readings from Last 3 Encounters:   01/06/20 119/75   10/04/19 109/76   09/17/19 119/83       Lab Results   Component Value Date    A1C 5.5 09/17/2019    A1C 5.3 01/24/2014    A1C 6.5 08/12/2013    A1C 5.7 08/22/2012    A1C 5.4 12/03/2008                Please complete refill and CLOSE ENCOUNTER.  Closing the encounter signifies the refill is complete.     Tissue Cultured Epidermal Autograft Text: The defect edges were debeveled with a #15 scalpel blade.  Given the location of the defect, shape of the defect and the proximity to free margins a tissue cultured epidermal autograft was deemed most appropriate.  The graft was then trimmed to fit the size of the defect.  The graft was then placed in the primary defect and oriented appropriately.

## 2021-09-25 ENCOUNTER — HEALTH MAINTENANCE LETTER (OUTPATIENT)
Age: 46
End: 2021-09-25

## 2021-09-28 ENCOUNTER — TELEPHONE (OUTPATIENT)
Dept: FAMILY MEDICINE | Facility: CLINIC | Age: 46
End: 2021-09-28

## 2021-09-28 NOTE — TELEPHONE ENCOUNTER
Attempted to call patient to follow up on asthma, unable to reach. Left voicemail for patient to return call.

## 2022-01-15 ENCOUNTER — HEALTH MAINTENANCE LETTER (OUTPATIENT)
Age: 47
End: 2022-01-15

## 2022-04-29 DIAGNOSIS — K21.9 GASTROESOPHAGEAL REFLUX DISEASE WITHOUT ESOPHAGITIS: ICD-10-CM

## 2022-04-29 RX ORDER — FAMOTIDINE 40 MG/1
TABLET, FILM COATED ORAL
Qty: 90 TABLET | OUTPATIENT
Start: 2022-04-29

## 2022-05-03 DIAGNOSIS — K21.9 GASTROESOPHAGEAL REFLUX DISEASE WITHOUT ESOPHAGITIS: ICD-10-CM

## 2022-05-03 NOTE — TELEPHONE ENCOUNTER
Message to physician:     Date of last visit: 6/15/2021    Date of next visit if scheduled: none    Potassium   Date Value Ref Range Status   04/29/2021 4.1 3.4 - 5.3 mmol/L Final     Creatinine   Date Value Ref Range Status   04/29/2021 1.2 0.8 - 1.5 mg/dL Final     GFR Estimate   Date Value Ref Range Status   08/12/2013 > 60 >60 ml/min/1.73m2 Final       BP Readings from Last 3 Encounters:   06/15/21 128/79   04/27/21 136/86   03/02/20 (!) 147/88       Hemoglobin A1C POCT   Date Value Ref Range Status   09/17/2019 5.5 4.1 - 5.7 % Final       Please complete refill and CLOSE ENCOUNTER.  Closing the encounter signifies the refill is complete.

## 2022-05-04 RX ORDER — FAMOTIDINE 40 MG/1
TABLET, FILM COATED ORAL
Qty: 90 TABLET | Refills: 1 | OUTPATIENT
Start: 2022-05-04

## 2023-01-07 ENCOUNTER — HEALTH MAINTENANCE LETTER (OUTPATIENT)
Age: 48
End: 2023-01-07

## 2023-03-27 ENCOUNTER — PATIENT OUTREACH (OUTPATIENT)
Dept: CARE COORDINATION | Facility: CLINIC | Age: 48
End: 2023-03-27
Payer: COMMERCIAL

## 2023-03-27 ASSESSMENT — ACTIVITIES OF DAILY LIVING (ADL): DEPENDENT_IADLS:: INDEPENDENT

## 2023-03-27 NOTE — PROGRESS NOTES
Clinic Care Coordination Contact  [facility]: Post-Discharge Note  SITUATION                                                      Admission:    Admission Date: 03/23/23   Reason for Admission: Idiopathic Pericarditis,Non-obstructive CAD, Obesity, Chest Pain, Shortness of Breath, HX of peptic Ulcer.  Discharge:   Discharge Date: 03/24/23  Discharge Diagnosis: Idiopathic Pericarditis,Non-obstructive CAD, Obesity, Chest Pain, Shortness of Breath, HX of peptic Ulcer.    BACKGROUND                                                      Per hospital discharge summary and inpatient provider notes.      ASSESSMENT           Discharge Assessment  How are you doing now that you are home?: Pt is doing better  How are your symptoms? (Red Flag symptoms escalate to triage hotline per guidelines): Improved  Do you feel your condition is stable enough to be safe at home until your provider visit?: Yes  Does the patient have their discharge instructions? : Yes  Does the patient have questions regarding their discharge instructions? : No  Were you started on any new medications or were there changes to any of your previous medications? : Yes  Does the patient have all of their medications?: Yes  Do you have questions regarding any of your medications? : No  Do you have all of your needed medical supplies or equipment (DME)?  (i.e. oxygen tank, CPAP, cane, etc.): No - What equipment or supplies are needed?  Discharge follow-up appointment scheduled within 14 calendar days? : Yes  Discharge Follow Up Appointment Date: 03/28/23  Discharge Follow Up Appointment Scheduled with?: Primary Care Provider                  PLAN                                                      Outpatient Plan:      Future Appointments   Date Time Provider Department Center   3/28/2023  2:00 PM Hougas, James E, MD [facility]         For any urgent concerns, please contact our 24 hour clinic line:   [facility]: [phone]       oBy  BASHIR Velasco

## 2023-03-28 ENCOUNTER — OFFICE VISIT (OUTPATIENT)
Dept: FAMILY MEDICINE | Facility: CLINIC | Age: 48
End: 2023-03-28
Payer: COMMERCIAL

## 2023-03-28 VITALS
OXYGEN SATURATION: 96 % | SYSTOLIC BLOOD PRESSURE: 138 MMHG | RESPIRATION RATE: 20 BRPM | BODY MASS INDEX: 44.55 KG/M2 | WEIGHT: 315 LBS | HEART RATE: 90 BPM | DIASTOLIC BLOOD PRESSURE: 85 MMHG | TEMPERATURE: 98.9 F

## 2023-03-28 DIAGNOSIS — R73.02 IMPAIRED GLUCOSE TOLERANCE: ICD-10-CM

## 2023-03-28 DIAGNOSIS — I25.84 CORONARY ARTERY DISEASE DUE TO CALCIFIED CORONARY LESION: ICD-10-CM

## 2023-03-28 DIAGNOSIS — E78.2 MIXED HYPERLIPIDEMIA: ICD-10-CM

## 2023-03-28 DIAGNOSIS — T63.441A BEE STING REACTION, ACCIDENTAL OR UNINTENTIONAL, INITIAL ENCOUNTER: ICD-10-CM

## 2023-03-28 DIAGNOSIS — Z09 HOSPITAL DISCHARGE FOLLOW-UP: Primary | ICD-10-CM

## 2023-03-28 DIAGNOSIS — I25.10 CORONARY ARTERY DISEASE DUE TO CALCIFIED CORONARY LESION: ICD-10-CM

## 2023-03-28 DIAGNOSIS — I30.0 ACUTE IDIOPATHIC PERICARDITIS: ICD-10-CM

## 2023-03-28 DIAGNOSIS — M54.50 CHRONIC BILATERAL LOW BACK PAIN WITHOUT SCIATICA: ICD-10-CM

## 2023-03-28 DIAGNOSIS — G89.29 CHRONIC BILATERAL LOW BACK PAIN WITHOUT SCIATICA: ICD-10-CM

## 2023-03-28 PROCEDURE — 99215 OFFICE O/P EST HI 40 MIN: CPT | Performed by: FAMILY MEDICINE

## 2023-03-28 PROCEDURE — 99417 PROLNG OP E/M EACH 15 MIN: CPT | Performed by: FAMILY MEDICINE

## 2023-03-28 RX ORDER — IBUPROFEN 600 MG/1
TABLET, FILM COATED ORAL
COMMUNITY
Start: 2023-03-24 | End: 2023-05-07

## 2023-03-28 RX ORDER — HYDROCODONE BITARTRATE AND ACETAMINOPHEN 5; 325 MG/1; MG/1
TABLET ORAL
COMMUNITY
Start: 2022-09-27 | End: 2023-03-28

## 2023-03-28 RX ORDER — EPINEPHRINE 0.3 MG/.3ML
0.3 INJECTION SUBCUTANEOUS PRN
Qty: 0.6 ML | Refills: 3 | Status: SHIPPED | OUTPATIENT
Start: 2023-03-28

## 2023-03-28 RX ORDER — BISACODYL 5 MG/1
1 TABLET, DELAYED RELEASE ORAL
COMMUNITY
Start: 2022-06-01 | End: 2023-03-28

## 2023-03-28 RX ORDER — TIZANIDINE HYDROCHLORIDE 2 MG/1
1 CAPSULE, GELATIN COATED ORAL
COMMUNITY
End: 2023-03-28

## 2023-03-28 RX ORDER — CHLORHEXIDINE/GLYCERIN/HE-CELL
JELLY (GRAM) TOPICAL
COMMUNITY
Start: 2022-06-01 | End: 2023-03-28

## 2023-03-28 RX ORDER — POLYETHYLENE GLYCOL 3350 17 G/17G
1 POWDER, FOR SOLUTION ORAL
COMMUNITY
Start: 2022-06-01 | End: 2023-03-28

## 2023-03-28 RX ORDER — FAMOTIDINE 20 MG/1
1 TABLET, FILM COATED ORAL
COMMUNITY
Start: 2022-05-31 | End: 2023-03-28

## 2023-03-28 RX ORDER — MAGNESIUM CITRATE
SOLUTION, ORAL ORAL
COMMUNITY
Start: 2022-06-01 | End: 2023-03-28

## 2023-03-28 RX ORDER — METHOCARBAMOL 500 MG/1
1 TABLET, FILM COATED ORAL
COMMUNITY
Start: 2021-05-13 | End: 2023-03-28

## 2023-03-28 RX ORDER — FAMOTIDINE 20 MG/1
1 TABLET, FILM COATED ORAL 2 TIMES DAILY
COMMUNITY
Start: 2022-05-31

## 2023-03-28 RX ORDER — METFORMIN HCL 500 MG
500 TABLET, EXTENDED RELEASE 24 HR ORAL
Qty: 90 TABLET | Refills: 1 | Status: SHIPPED | OUTPATIENT
Start: 2023-03-28 | End: 2023-06-30 | Stop reason: ALTCHOICE

## 2023-03-28 RX ORDER — IBUPROFEN 600 MG/1
TABLET, FILM COATED ORAL
COMMUNITY
Start: 2023-03-24 | End: 2023-03-28

## 2023-03-28 RX ORDER — FLUTICASONE PROPIONATE 50 MCG
2 SPRAY, SUSPENSION (ML) NASAL
COMMUNITY
Start: 2023-01-23

## 2023-03-28 RX ORDER — HYDROCODONE BITARTRATE AND ACETAMINOPHEN 5; 325 MG/1; MG/1
1-2 TABLET ORAL
COMMUNITY
Start: 2022-09-27 | End: 2023-03-28

## 2023-03-28 RX ORDER — PANTOPRAZOLE SODIUM 40 MG/1
40 TABLET, DELAYED RELEASE ORAL DAILY
COMMUNITY
Start: 2023-03-24 | End: 2023-05-31

## 2023-03-28 RX ORDER — COLCHICINE 0.6 MG/1
TABLET ORAL
COMMUNITY
Start: 2023-03-24 | End: 2023-03-28

## 2023-03-28 RX ORDER — ROSUVASTATIN CALCIUM 40 MG/1
40 TABLET, COATED ORAL DAILY
Qty: 90 TABLET | Refills: 3 | Status: SHIPPED | OUTPATIENT
Start: 2023-03-28 | End: 2023-05-31

## 2023-03-28 RX ORDER — ALBUTEROL SULFATE 90 UG/1
2-4 AEROSOL, METERED RESPIRATORY (INHALATION)
COMMUNITY
End: 2023-05-31

## 2023-03-28 RX ORDER — COLCHICINE 0.6 MG/1
0.6 TABLET ORAL
COMMUNITY
Start: 2023-03-24 | End: 2023-05-31

## 2023-03-28 RX ORDER — POLYETHYLENE GLYCOL 3350 17 G/17G
POWDER, FOR SOLUTION ORAL
COMMUNITY
Start: 2022-06-01 | End: 2023-03-28

## 2023-03-28 RX ORDER — BACLOFEN 10 MG/1
10 TABLET ORAL
Qty: 30 TABLET | Refills: 3 | Status: SHIPPED | OUTPATIENT
Start: 2023-03-28

## 2023-03-28 RX ORDER — DIPHENHYDRAMINE HCL 25 MG
50 TABLET ORAL
COMMUNITY

## 2023-03-28 RX ORDER — NAPROXEN 500 MG/1
500 TABLET ORAL
COMMUNITY
Start: 2023-01-06

## 2023-03-28 RX ORDER — BISACODYL 5 MG
TABLET, DELAYED RELEASE (ENTERIC COATED) ORAL
COMMUNITY
Start: 2022-06-01 | End: 2023-03-28

## 2023-03-28 RX ORDER — PANTOPRAZOLE SODIUM 40 MG/1
TABLET, DELAYED RELEASE ORAL
COMMUNITY
Start: 2023-03-24 | End: 2023-03-28

## 2023-03-28 ASSESSMENT — ASTHMA QUESTIONNAIRES
QUESTION_4 LAST FOUR WEEKS HOW OFTEN HAVE YOU USED YOUR RESCUE INHALER OR NEBULIZER MEDICATION (SUCH AS ALBUTEROL): ONCE A WEEK OR LESS
ACT_TOTALSCORE: 20
QUESTION_1 LAST FOUR WEEKS HOW MUCH OF THE TIME DID YOUR ASTHMA KEEP YOU FROM GETTING AS MUCH DONE AT WORK, SCHOOL OR AT HOME: A LITTLE OF THE TIME
ACT_TOTALSCORE: 20
QUESTION_2 LAST FOUR WEEKS HOW OFTEN HAVE YOU HAD SHORTNESS OF BREATH: ONCE OR TWICE A WEEK
QUESTION_5 LAST FOUR WEEKS HOW WOULD YOU RATE YOUR ASTHMA CONTROL: WELL CONTROLLED
QUESTION_3 LAST FOUR WEEKS HOW OFTEN DID YOUR ASTHMA SYMPTOMS (WHEEZING, COUGHING, SHORTNESS OF BREATH, CHEST TIGHTNESS OR PAIN) WAKE YOU UP AT NIGHT OR EARLIER THAN USUAL IN THE MORNING: ONCE OR TWICE

## 2023-03-28 ASSESSMENT — PATIENT HEALTH QUESTIONNAIRE - PHQ9: SUM OF ALL RESPONSES TO PHQ QUESTIONS 1-9: 6

## 2023-03-28 ASSESSMENT — ENCOUNTER SYMPTOMS: CONSTITUTIONAL NEGATIVE: 1

## 2023-03-28 NOTE — PROGRESS NOTES
Assessment & Plan     Hospital discharge follow-up  Doing much better and has his cardiology follow up scheduled. Start high intensity statin therapy with Crestor. Reviewed all of the tests and diagnoses from his hospital stay. Discussed pre-diabetes (below).    Coronary artery disease due to calcified coronary lesion  Needs high intensity therapy.  - rosuvastatin (CRESTOR) 40 MG tablet; Take 1 tablet (40 mg) by mouth daily    Impaired glucose tolerance  Discussed prediabetes. Discussed long-term complications associated with prediabetes. Recommended improving diet and exercise. Patient very interested in starting medicine today so we can do that too. Start metformin. Recommend recheck A1C in 3-6 months. Return to clinic at that time. Due for wellness at that time. Will schedule with his future PCP, Dr. Garcia (and switched in [facility]).  - metFORMIN (GLUCOPHAGE XR) 500 MG 24 hr tablet; Take 1 tablet (500 mg) by mouth daily (with dinner)    Mixed hyperlipidemia  See above.  - rosuvastatin (CRESTOR) 40 MG tablet; Take 1 tablet (40 mg) by mouth daily    Acute idiopathic pericarditis  Discussed the natural history of the disease. Complete the course of colchicine and NSAIDs.    Chronic bilateral low back pain without sciatica  Wasn't improving with his previous muscle relaxers with Flexeril, tizanidine, nor Robaxin. Try baclofen. Return to clinic as needed.  - baclofen (LIORESAL) 10 MG tablet; Take 1 tablet (10 mg) by mouth nightly as needed for muscle spasms    Bee sting reaction, accidental or unintentional, initial encounter  Needs a new one. Last was .  - EPINEPHrine (ANY BX GENERIC EQUIV) 0.3 MG/0.3ML injection 2-pack; Inject 0.3 mLs (0.3 mg) into the muscle as needed for anaphylaxis    Review of external notes as documented elsewhere in note    82 minutes spent on the date of the encounter doing chart review, history and exam, documentation, and further activities as noted above.       MED REC REQUIRED -  completed  Post Medication Reconciliation Status:       Work on weight loss  Regular exercise  See Patient Instructions    No follow-ups on file.    Eliu Rodarte MD  [facility] [place]    Fernando Rod Sr is a 47 year old, presenting for the following health issues:  Hospital Follow up  (Went to hospital on 3/23/23. )  No flowsheet data found.  Had acute onset of chest pain 23 Mar 23 when he was at  school. Also, had shortness of breath. Now, his symptoms are almost completely resolved and he is feeling much better.        14 Apr Cardiology appointment.    Doesn't have too many sugar sweetened beverages. Drinks alcohol a few times a month.    Post Discharge Outreach 3/27/2023   Admission Date 3/23/2023   Reason for Admission Idiopathic Pericarditis,Non-obstructive CAD, Obesity, Chest Pain, Shortness of Breath, HX of peptic Ulcer.   Discharge Date 3/24/2023   Discharge Diagnosis Idiopathic Pericarditis,Non-obstructive CAD, Obesity, Chest Pain, Shortness of Breath, HX of peptic Ulcer.   How are you doing now that you are home? Pt is doing better   How are your symptoms? (Red Flag symptoms escalate to triage hotline per guidelines) Improved   Do you feel your condition is stable enough to be safe at home until your provider visit? Yes   Does the patient have their discharge instructions?  Yes   Does the patient have questions regarding their discharge instructions?  No   Were you started on any new medications or were there changes to any of your previous medications?  Yes   Does the patient have all of their medications? Yes   Do you have questions regarding any of your medications?  No   Do you have all of your needed medical supplies or equipment (DME)?  (i.e. oxygen tank, CPAP, cane, etc.) No - What equipment or supplies are needed?   Discharge follow-up appointment scheduled within 14 calendar days?  Yes   Discharge Follow Up Appointment Date 3/28/2023    Discharge Follow Up Appointment Scheduled with? Primary Care Provider     Hospital Follow-up Visit:    Hospital/Nursing Home/IP Rehab Facility: [facility]  Date of Admission: 23 Mar 23  Date of Discharge: 24 Mar 23  Reason(s) for Admission: Chest pain, heart catheterization, pericarditits    Was your hospitalization related to COVID-19? No   Problems taking medications regularly:  None  Medication changes since discharge: None  Problems adhering to non-medication therapy:  None    Summary of hospitalization:  CareEverywhere information obtained and reviewed  Diagnostic Tests/Treatments reviewed.  Follow up needed: A1c  Other Healthcare Providers Involved in Patient s Care:         Specialist appointment - cardiology in 3-4 weeks  Update since discharge: improved.   Plan of care communicated with patient     Review of Systems   Constitutional: Negative.            Objective    /85   Pulse 90   Temp 98.9  F (37.2  C) (Tympanic)   Resp 20   Wt (!) 157.4 kg (347 lb)   SpO2 96%   BMI 44.55 kg/m    Body mass index is 44.55 kg/m .  Physical Exam  Vitals and nursing note reviewed.   Constitutional:       General: He is not in acute distress.     Appearance: Normal appearance. He is not ill-appearing, toxic-appearing or diaphoretic.   Pulmonary:      Effort: No respiratory distress.   Neurological:      Mental Status: He is alert and oriented to person, place, and time.           Hgb A1C  Order: [id number]   Ref Range & Units 5 d ago   Hemoglobin A1C <=5.6 % 5.8 High     Estimated Average Glucose (Calc) < 117 mg/dL 120    Comment: Estimated average glucose (eAG) converts A1c into glucose units (mg/dL) and estimates average glucose over the past approximately 3 months.  The eAG reference interval (<117 mg/dL) corresponds to an A1c of <5.7%.

## 2023-04-22 ENCOUNTER — HEALTH MAINTENANCE LETTER (OUTPATIENT)
Age: 48
End: 2023-04-22

## 2023-05-31 ENCOUNTER — MYC MEDICAL ADVICE (OUTPATIENT)
Dept: CARE COORDINATION | Facility: CLINIC | Age: 48
End: 2023-05-31

## 2023-05-31 ENCOUNTER — ALLIED HEALTH/NURSE VISIT (OUTPATIENT)
Dept: FAMILY MEDICINE | Facility: CLINIC | Age: 48
End: 2023-05-31
Payer: COMMERCIAL

## 2023-05-31 ENCOUNTER — OFFICE VISIT (OUTPATIENT)
Dept: FAMILY MEDICINE | Facility: CLINIC | Age: 48
End: 2023-05-31
Payer: COMMERCIAL

## 2023-05-31 VITALS
RESPIRATION RATE: 20 BRPM | HEIGHT: 74 IN | WEIGHT: 315 LBS | HEART RATE: 94 BPM | DIASTOLIC BLOOD PRESSURE: 74 MMHG | BODY MASS INDEX: 40.43 KG/M2 | OXYGEN SATURATION: 98 % | TEMPERATURE: 98.5 F | SYSTOLIC BLOOD PRESSURE: 116 MMHG

## 2023-05-31 DIAGNOSIS — F99 INSOMNIA DUE TO OTHER MENTAL DISORDER: ICD-10-CM

## 2023-05-31 DIAGNOSIS — I25.10 CORONARY ARTERY DISEASE DUE TO CALCIFIED CORONARY LESION: ICD-10-CM

## 2023-05-31 DIAGNOSIS — R61 GENERALIZED HYPERHIDROSIS: ICD-10-CM

## 2023-05-31 DIAGNOSIS — Z12.11 SCREENING FOR COLON CANCER: ICD-10-CM

## 2023-05-31 DIAGNOSIS — Z00.00 ROUTINE GENERAL MEDICAL EXAMINATION AT A HEALTH CARE FACILITY: Primary | ICD-10-CM

## 2023-05-31 DIAGNOSIS — F32.9 MAJOR DEPRESSION, CHRONIC: Primary | ICD-10-CM

## 2023-05-31 DIAGNOSIS — Z78.9 ALCOHOL USE: ICD-10-CM

## 2023-05-31 DIAGNOSIS — I25.84 CORONARY ARTERY DISEASE DUE TO CALCIFIED CORONARY LESION: ICD-10-CM

## 2023-05-31 DIAGNOSIS — R73.03 PREDIABETES: ICD-10-CM

## 2023-05-31 DIAGNOSIS — E78.2 MIXED HYPERLIPIDEMIA: ICD-10-CM

## 2023-05-31 DIAGNOSIS — F51.05 INSOMNIA DUE TO OTHER MENTAL DISORDER: ICD-10-CM

## 2023-05-31 PROBLEM — K76.0 HEPATIC STEATOSIS: Status: ACTIVE | Noted: 2022-02-18

## 2023-05-31 PROBLEM — Z96.1 PSEUDOPHAKIA, BOTH EYES: Status: ACTIVE | Noted: 2020-02-10

## 2023-05-31 PROBLEM — I25.119 CORONARY ARTERY DISEASE INVOLVING NATIVE CORONARY ARTERY OF NATIVE HEART WITH ANGINA PECTORIS (H): Status: ACTIVE | Noted: 2023-03-23

## 2023-05-31 LAB — HBA1C MFR BLD: 5.7 % (ref 0–5.6)

## 2023-05-31 PROCEDURE — 36416 COLLJ CAPILLARY BLOOD SPEC: CPT

## 2023-05-31 PROCEDURE — 99207 PR NO CHARGE NURSE ONLY: CPT

## 2023-05-31 PROCEDURE — 99396 PREV VISIT EST AGE 40-64: CPT | Mod: GC

## 2023-05-31 PROCEDURE — 83036 HEMOGLOBIN GLYCOSYLATED A1C: CPT

## 2023-05-31 RX ORDER — ROSUVASTATIN CALCIUM 40 MG/1
40 TABLET, COATED ORAL DAILY
Qty: 90 TABLET | Refills: 3 | Status: SHIPPED | OUTPATIENT
Start: 2023-05-31

## 2023-05-31 RX ORDER — ASPIRIN 81 MG/1
81 TABLET ORAL DAILY
Qty: 90 TABLET | Refills: 3 | Status: SHIPPED | OUTPATIENT
Start: 2023-05-31

## 2023-05-31 ASSESSMENT — ANXIETY QUESTIONNAIRES
GAD7 TOTAL SCORE: 13
IF YOU CHECKED OFF ANY PROBLEMS ON THIS QUESTIONNAIRE, HOW DIFFICULT HAVE THESE PROBLEMS MADE IT FOR YOU TO DO YOUR WORK, TAKE CARE OF THINGS AT HOME, OR GET ALONG WITH OTHER PEOPLE: SOMEWHAT DIFFICULT
2. NOT BEING ABLE TO STOP OR CONTROL WORRYING: SEVERAL DAYS
5. BEING SO RESTLESS THAT IT IS HARD TO SIT STILL: MORE THAN HALF THE DAYS
3. WORRYING TOO MUCH ABOUT DIFFERENT THINGS: MORE THAN HALF THE DAYS
1. FEELING NERVOUS, ANXIOUS, OR ON EDGE: SEVERAL DAYS
GAD7 TOTAL SCORE: 13
6. BECOMING EASILY ANNOYED OR IRRITABLE: MORE THAN HALF THE DAYS
7. FEELING AFRAID AS IF SOMETHING AWFUL MIGHT HAPPEN: MORE THAN HALF THE DAYS

## 2023-05-31 ASSESSMENT — ENCOUNTER SYMPTOMS
DIARRHEA: 0
HEADACHES: 0
EYE PAIN: 0
COUGH: 0
HEMATURIA: 0
MYALGIAS: 1
JOINT SWELLING: 0
CHILLS: 0
NERVOUS/ANXIOUS: 1
FEVER: 0
PALPITATIONS: 0
FREQUENCY: 0
NAUSEA: 0
SORE THROAT: 0
HEMATOCHEZIA: 0
HEARTBURN: 0
PARESTHESIAS: 0
SHORTNESS OF BREATH: 1
DYSURIA: 0
WEAKNESS: 1
ABDOMINAL PAIN: 0
ARTHRALGIAS: 1
CONSTIPATION: 0
DIZZINESS: 0

## 2023-05-31 ASSESSMENT — PATIENT HEALTH QUESTIONNAIRE - PHQ9
5. POOR APPETITE OR OVEREATING: NEARLY EVERY DAY
SUM OF ALL RESPONSES TO PHQ QUESTIONS 1-9: 11

## 2023-05-31 ASSESSMENT — ASTHMA QUESTIONNAIRES: ACT_TOTALSCORE: 19

## 2023-05-31 ASSESSMENT — ACTIVITIES OF DAILY LIVING (ADL)
DEPENDENT_IADLS:: INDEPENDENT
CURRENT_FUNCTION: MONEY MANAGEMENT REQUIRES ASSISTANCE

## 2023-05-31 NOTE — PROGRESS NOTES
Clinic Care Coordination Contact    Clinic Care Coordination Contact  OUTREACH    Referral Information:  Referral Source: PCP    Primary Diagnosis: Behavioral Health (Idiopathic Pericarditis,Non-obstructive CAD, Obesity, Chest Pain, Shortness of Breath, HX of peptic Ulcer.)    Chief Complaint   Patient presents with     Clinic Care Coordination - Initial        Universal Utilization:   Clinic Utilization  Difficulty keeping appointments:: No  Compliance Concerns: No  No-Show Concerns: No  No PCP office visit in Past Year: No  Utilization    Hospital Admissions  0             ED Visits  0             No Show Count (past year)  0                Current as of: 5/31/2023 10:48 AM            Clinical Concerns:  Current Medical Concerns: Prediabetes, coronary artery disease, exercise-induced asthma  Current Behavioral Concerns:  Anxiety, Major Depression, Alcohol Use Disorder  Education Provided to patient: Care Coordination, SW services   Pain  Pain (GOAL):: No  Health Maintenance Reviewed:    Clinical Pathway: None    Medication Management:  Medication review status: Medications reviewed and no changes reported per patient.           Functional Status:  Dependent ADLs:: Independent  Dependent IADLs:: Independent  Bed or wheelchair confined:: No  Mobility Status: Independent  Fallen 2 or more times in the past year?: No  Any fall with injury in the past year?: No    Living Situation:  Current living arrangement:: I live alone  [facility]    Lifestyle & Psychosocial Needs:    Social Determinants of Health     Tobacco Use: Medium Risk (5/31/2023)    Patient History      Smoking Tobacco Use: Former      Smokeless Tobacco Use: Never      Passive Exposure: Not on file   Alcohol Use: Not on file   Financial Resource Strain: Not on file   Food Insecurity: Not on file   Transportation Needs: Not on file   Physical Activity: Not on file   Stress: Stress Concern Present (5/31/2023)    [place]  [place] [employer] - Occupational Stress Questionnaire      Feeling of Stress : Very much   Social Connections: Not on file   Intimate Partner Violence: Not on file   Depression: Not at risk (5/31/2023)    PHQ-2      PHQ-2 Score: 0   Housing Stability: Not on file     Diet:: Regular  Inadequate nutrition (GOAL):: No  Tube Feeding: No  Inadequate activity/exercise (GOAL):: No  Significant changes in sleep pattern (GOAL): No  Transportation means:: Medical transport     Sikh or spiritual beliefs that impact treatment:: No  Mental health DX:: Yes  Mental health DX how managed:: Medication  Mental health management concern (GOAL):: No  Chemical Dependency Status: Current Concern  Informal Support system:: Family, Friends      Resources and Interventions:  Current Resources:      Community Resources: None  Supplies Currently Used at Home: None  Equipment Currently Used at Home: none  Employment Status: unemployed       Referrals Placed: None     Care Plan:  Care Plan: Mental Health     Problem: Mental Health Symptoms Need Improvement     Goal: Improve management of mental health symptoms and establish with mental health/psychosocial supports     Start Date: 5/31/2023    This Visit's Progress: 100%    Priority: High    Note:     I am starting to build trust with and work with my clinic  to manage and deal with my emotional and mental health.                        Patient/Caregiver understanding: Yes.    Outreach Frequency: monthly  Future Appointments              In 1 month Nai Garcia MD [facility] [place]          Plan:   Patient to utilize clinic resources as necessary to meet identified goal.    NICOLLE CHANDLER, LGSW, LADC

## 2023-05-31 NOTE — PROGRESS NOTES
Preceptor Attestation:   Patient seen, evaluated and discussed with the resident. I have verified the content of the note, which accurately reflects my assessment of the patient and the plan of care.    Supervising Physician:Radha Diaz MD    [facility]   [facility]

## 2023-05-31 NOTE — PROGRESS NOTES
SUBJECTIVE:   CC: Dain Rod Sr is an 48 year old who presents for preventative health visit.        View : No data to display.            Patient has been advised of split billing requirements and indicates understanding: Yes  HPI    Last visit 3/28/23 was for hospital follow-up after epsidoe of acute idiopathic pericarditis. Known non-obstructive CAD. Angiogram showed mild to moderate, non-obstructive CAD, most notably for 60% stenosis of OM1. No interventions were performed.  Cardiology follow-up 4/14 at [facility].     Should restart ASA once off NSAIDs for pericarditis   Only taking naproxen as needed.   No other episodes.    Patient is somewhat confused about which medications he should and should not be taking. Spent a long time on med reconciliation.     PMH: CAD, mixed hyperlipidemia, prediabetes, chronic low back pain.   Current medications: rosuvastatin, metformin, baclofen     Prediabetes  A1C was 5.8% in March 2023.    Started metformin 500 mg daily in March   Due for A1C recheck today   Discussed exercise and nutrition changes for BG control    Asthma   Only uses albuterol prn with working out   Otherwise no shortness of breath, coughing fits, wheezing.    JORDY  Reports insomnia due to racing thoughts   History of anxiety, MDD, alcohol use disorder     Preventative Care   Colonoscopy 6/2022 - one 3mm tubular adenoma - recommend follow-up colonoscopy in 7-10 years.  Does not want any vaccines today    Social history  Daughters ages 32, 31, 21  Son age 28        Have you ever done Advance Care Planning? (For example, a Health Directive, POLST, or a discussion with a medical provider or your loved ones about your wishes): Daniela do not know anything about his been preferences    Social History     Tobacco Use     Smoking status: Former     Smokeless tobacco: Never     Tobacco comments:     Marijuana no cigaretts   Vaping Use     Vaping status: Never Used   Substance Use Topics     Alcohol use: No  "            5/31/2023     8:56 AM   Alcohol Use   Prescreen: >3 drinks/day or >7 drinks/week? Not Applicable       Last PSA:   PSA   Date Value Ref Range Status   05/06/2021 0.5 0.0 - 2.5 ng/mL Final       Reviewed orders with patient. Reviewed health maintenance and updated orders accordingly - Yes  Lab work is in process    Reviewed and updated as needed this visit by clinical staff   Tobacco   Meds  Problems             Reviewed and updated as needed this visit by Provider     Meds  Problems            Past Medical History:   Diagnosis Date     Chronic back pain       Past Surgical History:   Procedure Laterality Date     SHOULDER SURGERY Left     possible rotator cuff repair       Review of Systems  CONSTITUTIONAL: NEGATIVE for fever, chills, change in weight  INTEGUMENTARY/SKIN: NEGATIVE for worrisome rashes, moles or lesions  EYES: NEGATIVE for vision changes or irritation  ENT: NEGATIVE for ear, mouth and throat problems  RESP: NEGATIVE for significant cough or SOB  CV: NEGATIVE for chest pain, palpitations or peripheral edema  GI: NEGATIVE for nausea, abdominal pain, heartburn, or change in bowel habits   male: negative for dysuria, hematuria, decreased urinary stream, erectile dysfunction, urethral discharge  MUSCULOSKELETAL: NEGATIVE for significant arthralgias or myalgia  NEURO: NEGATIVE for weakness, dizziness or paresthesias  PSYCHIATRIC: NEGATIVE for changes in mood or affect    OBJECTIVE:   /74   Pulse 94   Temp 98.5  F (36.9  C) (Oral)   Resp 20   Ht 1.88 m (6' 2\")   Wt (!) 158.8 kg (350 lb)   SpO2 98%   BMI 44.94 kg/m      Physical Exam  GENERAL: healthy, alert and no distress  EYES: Eyes grossly normal to inspection, PERRL and conjunctivae and sclerae normal  HENT: ear canals and TM's normal, nose and mouth without ulcers or lesions  NECK: no adenopathy, no asymmetry, masses, or scars and thyroid normal to palpation  RESP: lungs clear to auscultation - no rales, rhonchi or " wheezes  CV: regular rate and rhythm, normal S1 S2, no S3 or S4, no murmur, click or rub, no peripheral edema and peripheral pulses strong  ABDOMEN: soft, nontender, no hepatosplenomegaly, no masses and bowel sounds normal  MS: no gross musculoskeletal defects noted, no edema  SKIN: no suspicious lesions or rashes  NEURO: Normal strength and tone, mentation intact and speech normal  PSYCH: mentation appears normal, affect normal/bright    Diagnostic Test Results:  Labs reviewed in Epic    ASSESSMENT/PLAN:   Adan was seen today for physical.    Diagnoses and all orders for this visit:    Routine general medical examination at a health care facility  Patient reports he is doing well overall today. Expresses concern regarding recent hospitalization, insomnia, prediabetes, see details below. Reviewed and updated problem list, medication list, past medical history, surgical history, family history, and social history. VSS. Exam with no concerning findings. Preventative care reviewed. Declined vaccinations today.     Coronary artery disease due to calcified coronary lesion  Known non-obstructive CAD. Angiogram March 2023 showed mild to moderate, non-obstructive CAD, most notably for 60% stenosis of OM1. No interventions were performed. Had been hospitalized for acute idiopathic pericarditis, which has since resoled. Now off scheduled NSAIDs so safe to start daily aspirin for CAD.  -     rosuvastatin (CRESTOR) 40 MG tablet; Take 1 tablet (40 mg) by mouth daily  -     aspirin 81 MG EC tablet; Take 1 tablet (81 mg) by mouth daily    Insomnia   Reports difficulty sleeping at night due to racing thoughts. MELISSA Osei met with patient today to discuss this and offer some behavioral modifications for improving sleep. Patient currently not interested in therapy or medications to help with anxiety, insomnia.  - Continue to monitor     Mixed hyperlipidemia  -     rosuvastatin (CRESTOR) 40 MG tablet; Take 1 tablet (40 mg)  by mouth daily    Prediabetes  History of prediabetes with most recent A1C 5.7%. Elected to start metformin on diagnosis of prediabetes 3 months ago.   -     Hemoglobin A1c    Patient has been advised of split billing requirements and indicates understanding: Yes      COUNSELING:   Reviewed preventive health counseling, as reflected in patient instructions  Special attention given to:        Regular exercise       Healthy diet/nutrition       Alcohol Use        Colorectal cancer screening        He reports that he has quit smoking. He has never used smokeless tobacco.      Nai Garica MD  [facility]

## 2023-05-31 NOTE — PATIENT INSTRUCTIONS
MEDICATIONS:     Scheduled Every Day Medications:   - Metformin (diabetes)   - Rosuvastatin (high cholesterol)   - Aspirin (heart)     As Needed Medications:   - Banofen (as needed for allergies)  - Baclofen (as needed for back pain)     Stop Taking:   - Stop taking colchicine   - Stop taking pantoprazole         Preventive Health Recommendations  Male Ages 50 - 64    Yearly exam:             See your health care provider every year in order to  o   Review health changes.   o   Discuss preventive care.    o   Review your medicines if your doctor has prescribed any.   Have a cholesterol test every 5 years, or more frequently if you are at risk for high cholesterol/heart disease.   Have a diabetes test (fasting glucose) every three years. If you are at risk for diabetes, you should have this test more often.   Have a colonoscopy at age 50, or have a yearly FIT test (stool test). These exams will check for colon cancer.    Talk with your health care provider about whether or not a prostate cancer screening test (PSA) is right for you.  You should be tested each year for STDs (sexually transmitted diseases), if you re at risk.     Shots: Get a flu shot each year. Get a tetanus shot every 10 years.     Nutrition:  Eat at least 5 servings of fruits and vegetables daily.   Eat whole-grain bread, whole-wheat pasta and brown rice instead of white grains and rice.   Get adequate Calcium and Vitamin D.     Lifestyle  Exercise for at least 150 minutes a week (30 minutes a day, 5 days a week). This will help you control your weight and prevent disease.   Limit alcohol to one drink per day.   No smoking.   Wear sunscreen to prevent skin cancer.   See your dentist every six months for an exam and cleaning.   See your eye doctor every 1 to 2 years.
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
## 2023-06-30 ENCOUNTER — OFFICE VISIT (OUTPATIENT)
Dept: FAMILY MEDICINE | Facility: CLINIC | Age: 48
End: 2023-06-30
Payer: COMMERCIAL

## 2023-06-30 ENCOUNTER — DOCUMENTATION ONLY (OUTPATIENT)
Dept: PSYCHOLOGY | Facility: CLINIC | Age: 48
End: 2023-06-30

## 2023-06-30 VITALS
TEMPERATURE: 97.7 F | BODY MASS INDEX: 40.43 KG/M2 | SYSTOLIC BLOOD PRESSURE: 119 MMHG | HEART RATE: 69 BPM | WEIGHT: 315 LBS | OXYGEN SATURATION: 95 % | RESPIRATION RATE: 18 BRPM | HEIGHT: 74 IN | DIASTOLIC BLOOD PRESSURE: 80 MMHG

## 2023-06-30 DIAGNOSIS — R45.4 ANGER: ICD-10-CM

## 2023-06-30 DIAGNOSIS — F51.05 INSOMNIA DUE TO OTHER MENTAL DISORDER: Primary | ICD-10-CM

## 2023-06-30 DIAGNOSIS — F99 INSOMNIA DUE TO OTHER MENTAL DISORDER: Primary | ICD-10-CM

## 2023-06-30 DIAGNOSIS — F32.9 MAJOR DEPRESSION, CHRONIC: ICD-10-CM

## 2023-06-30 DIAGNOSIS — F41.1 GENERALIZED ANXIETY DISORDER: ICD-10-CM

## 2023-06-30 PROCEDURE — 99214 OFFICE O/P EST MOD 30 MIN: CPT | Mod: GC

## 2023-06-30 RX ORDER — LANOLIN ALCOHOL/MO/W.PET/CERES
3 CREAM (GRAM) TOPICAL EVERY EVENING
Qty: 90 TABLET | Refills: 0 | Status: SHIPPED | OUTPATIENT
Start: 2023-06-30

## 2023-06-30 NOTE — PATIENT INSTRUCTIONS
- Take rosuvastatin (for high cholesterol)   - Take aspirin (to prevent heart attacks)   - Start taking melatonin in the evening after dinner (to help with sleep).   - You can stop taking the metformin.   - Continue your great diet and exercise changes!

## 2023-06-30 NOTE — PROGRESS NOTES
Primary Care Behavioral Health Consult Note    Client's Legal Name: Adan Rod    Client's Preferred Name: Adan Rod Sr  YOB: 1975  Length of Service: 10 min    Reason for consult: Dr. Garcia requested behavioral health consultation for this patient regarding mood, sleep, anxiety, anger concerns. Patient has a history of mental health concerns including anger outbursts, racing thoughts. Per chart review, there may be some suspicion for bipolar disorder, but uncertain origin of this diagnosis. Sleep concerns include difficulty falling asleep due to racing thoughts, patient is keeping a wake/sleep time. Had a poor encounter with a psychotherapist in the past, who had to report him due to homicidal thoughts. This consult included discussion with provider and review of electronic health record.    Recommendations and Plan:    See consulting psychiatrist to help clarify diagnosis.    Consider  covisit at our clinic to work on patient trust with psychotherapy and follow up on anger, mood, anxiety.    These recommendations were discussed with referring provider Dr. Garcia.    Jade Ribeiro, PhD LMFT    Disclaimer  The above treatment recommendations are based on consultation with the patient's primary care provider and a review of relevant information in EPIC. I have not personally examined the patient.  All recommendations should be implemented with considerations of the patient's relevant prior history and current clinical status. Please contact me with any questions about the care of this patient.      Patient Active Problem List   Diagnosis     Sleep apnea     Anxiety state     Cataract     Major depression, chronic     Other chronic pain     Contact dermatitis and other eczema, due to unspecified cause     Generalized hyperhidrosis     Exercise-induced asthma     Morbid obesity (H)     Peptic ulcer     Scoliosis (and kyphoscoliosis), idiopathic     Vitamin D deficiency     Pain,  flank, bilateral     Chronic low back pain     Health Care Home     Chronic rhinitis     Primary osteoarthritis of both knees     Allergic to bees     Hyperlipidemia LDL goal <100     Raised intraocular pressure     Thoracic and lumbosacral neuritis     Spondylosis of lumbosacral spine without myelopathy     Primary open angle glaucoma of both eyes, mild stage     Deprivation amblyopia of both eyes     Alcohol use     Gross hematuria     Hematospermia     Lower urinary tract symptoms (LUTS)     Prostate varices     Coronary artery disease involving native coronary artery of native heart with angina pectoris (H)     Pseudophakia, both eyes     Prediabetes     Hepatic steatosis     ETOH abuse     Current Outpatient Medications   Medication     albuterol (PROAIR HFA) 108 (90 Base) MCG/ACT inhaler     aspirin 81 MG EC tablet     baclofen (LIORESAL) 10 MG tablet     brimonidine (ALPHAGAN) 0.2 % ophthalmic solution     diphenhydrAMINE (BENADRYL) 25 MG tablet     EPINEPHrine (ANY BX GENERIC EQUIV) 0.3 MG/0.3ML injection 2-pack     famotidine (PEPCID) 20 MG tablet     fluticasone (FLONASE) 50 MCG/ACT nasal spray     melatonin 3 MG tablet     naproxen (NAPROSYN) 500 MG tablet     rosuvastatin (CRESTOR) 40 MG tablet     No current facility-administered medications for this visit.       Mental Health Screening Questionnaires:  PHQ-9:       10/4/2019     9:58 AM 3/28/2023     1:45 PM 5/31/2023     9:21 AM   PHQ   PHQ-9 Total Score 16 6 11   Q9: Thoughts of better off dead/self-harm past 2 weeks Not at all Not at all Not at all     C-SSRS:   [place] Suicide Severity Rating Scale (Short Version)       No data to display               JORDY-7:       10/4/2019     9:58 AM 5/31/2023     9:21 AM   JORDY-7 SCORE   Total Score 10 13

## 2023-07-05 ENCOUNTER — PATIENT OUTREACH (OUTPATIENT)
Dept: FAMILY MEDICINE | Facility: CLINIC | Age: 48
End: 2023-07-05
Payer: COMMERCIAL

## 2023-07-05 NOTE — TELEPHONE ENCOUNTER
Patient Quality Outreach    Patient is due for the following:   Asthma  -  Asthma follow-up visit    Next Steps:   no answer. LVm    Type of outreach:    Phone, spoke to patient/parent. LVM    Next Steps:  Reach out within 90 days via Phone.    Max number of attempts reached: No. Will try again in 90 days if patient still on fail list.    Questions for provider review:    None           [address]  Chart routed to Care Team.

## 2023-07-11 PROBLEM — F51.05 INSOMNIA DUE TO OTHER MENTAL DISORDER: Status: ACTIVE | Noted: 2023-07-11

## 2023-07-11 PROBLEM — F99 INSOMNIA DUE TO OTHER MENTAL DISORDER: Status: ACTIVE | Noted: 2023-07-11

## 2023-07-11 PROBLEM — R45.4 ANGER: Status: ACTIVE | Noted: 2023-07-11

## 2023-07-13 ENCOUNTER — MYC MEDICAL ADVICE (OUTPATIENT)
Dept: CARE COORDINATION | Facility: CLINIC | Age: 48
End: 2023-07-13
Payer: COMMERCIAL

## 2023-08-29 ENCOUNTER — PATIENT OUTREACH (OUTPATIENT)
Dept: CARE COORDINATION | Facility: CLINIC | Age: 48
End: 2023-08-29
Payer: COMMERCIAL

## 2023-08-29 DIAGNOSIS — F41.1 ANXIETY STATE: Primary | ICD-10-CM

## 2023-08-29 DIAGNOSIS — F32.9 MAJOR DEPRESSION, CHRONIC: ICD-10-CM

## 2023-08-29 DIAGNOSIS — Z78.9 ALCOHOL USE: ICD-10-CM

## 2023-08-29 NOTE — CONFIDENTIAL NOTE
Clinic Care Coordination Contact  [facility]/Voicemail       Clinical Data: Care Coordinator Outreach  Outreach attempted x 1.  Left message on patient's voicemail with call back information and requested return call.    NICOLLE CHANDLER, ROSHNI, LADC

## 2023-09-07 ENCOUNTER — TELEPHONE (OUTPATIENT)
Dept: CARE COORDINATION | Facility: CLINIC | Age: 48
End: 2023-09-07
Payer: COMMERCIAL

## 2023-09-07 NOTE — TELEPHONE ENCOUNTER
SW called patient regarding mental health referral. Patient and SW scheduled patient for 10/16/23 at 10:30am. Patient reported having received a call from another clinic this date but hasn't returned call. SW suggested patient may want to call other clinic to determine which clinic can see patient soonest; patient vocalized intent to do so.    NICOLLE CHANDLER, EMPERATRIZSW, [facility]

## 2023-10-16 ENCOUNTER — OFFICE VISIT (OUTPATIENT)
Dept: PSYCHOLOGY | Facility: CLINIC | Age: 48
End: 2023-10-16
Payer: COMMERCIAL

## 2023-10-16 VITALS
HEIGHT: 74 IN | SYSTOLIC BLOOD PRESSURE: 127 MMHG | DIASTOLIC BLOOD PRESSURE: 83 MMHG | BODY MASS INDEX: 40.43 KG/M2 | OXYGEN SATURATION: 96 % | WEIGHT: 315 LBS | HEART RATE: 82 BPM

## 2023-10-16 DIAGNOSIS — F41.9 ANXIETY DISORDER, UNSPECIFIED TYPE: ICD-10-CM

## 2023-10-16 DIAGNOSIS — F43.10 POSTTRAUMATIC STRESS DISORDER: Primary | ICD-10-CM

## 2023-10-16 DIAGNOSIS — G47.00 INSOMNIA, UNSPECIFIED TYPE: ICD-10-CM

## 2023-10-16 PROCEDURE — 90792 PSYCH DIAG EVAL W/MED SRVCS: CPT | Performed by: PSYCHIATRY & NEUROLOGY

## 2023-10-16 RX ORDER — CLONIDINE HYDROCHLORIDE 0.1 MG/1
TABLET ORAL
Qty: 60 TABLET | Refills: 1 | Status: SHIPPED | OUTPATIENT
Start: 2023-10-16 | End: 2023-11-13 | Stop reason: DRUGHIGH

## 2023-10-16 ASSESSMENT — PATIENT HEALTH QUESTIONNAIRE - PHQ9
SUM OF ALL RESPONSES TO PHQ QUESTIONS 1-9: 8
10. IF YOU CHECKED OFF ANY PROBLEMS, HOW DIFFICULT HAVE THESE PROBLEMS MADE IT FOR YOU TO DO YOUR WORK, TAKE CARE OF THINGS AT HOME, OR GET ALONG WITH OTHER PEOPLE: SOMEWHAT DIFFICULT
SUM OF ALL RESPONSES TO PHQ QUESTIONS 1-9: 8
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
## 2023-11-10 ENCOUNTER — TELEPHONE (OUTPATIENT)
Dept: FAMILY MEDICINE | Facility: CLINIC | Age: 48
End: 2023-11-10
Payer: COMMERCIAL

## 2023-11-10 NOTE — TELEPHONE ENCOUNTER
Writer called and spoke to patient. Reminded of appointment Monday at 0930hours with . Patient verbalized understanding. Chrissy REINA

## 2023-11-13 ENCOUNTER — VIRTUAL VISIT (OUTPATIENT)
Dept: PSYCHOLOGY | Facility: CLINIC | Age: 48
End: 2023-11-13
Attending: PSYCHIATRY & NEUROLOGY
Payer: COMMERCIAL

## 2023-11-13 DIAGNOSIS — F43.10 POSTTRAUMATIC STRESS DISORDER: Primary | ICD-10-CM

## 2023-11-13 DIAGNOSIS — G47.00 INSOMNIA, UNSPECIFIED TYPE: ICD-10-CM

## 2023-11-13 DIAGNOSIS — F41.9 ANXIETY DISORDER, UNSPECIFIED TYPE: ICD-10-CM

## 2023-11-13 PROCEDURE — 99214 OFFICE O/P EST MOD 30 MIN: CPT | Mod: 95 | Performed by: PSYCHIATRY & NEUROLOGY

## 2023-11-13 RX ORDER — CLONIDINE HYDROCHLORIDE 0.2 MG/1
TABLET ORAL
Qty: 45 TABLET | Refills: 0 | Status: SHIPPED | OUTPATIENT
Start: 2023-11-13 | End: 2023-12-11
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
## 2023-12-11 ENCOUNTER — VIRTUAL VISIT (OUTPATIENT)
Dept: PSYCHOLOGY | Facility: CLINIC | Age: 48
End: 2023-12-11
Payer: COMMERCIAL

## 2023-12-11 DIAGNOSIS — G47.00 INSOMNIA, UNSPECIFIED TYPE: ICD-10-CM

## 2023-12-11 DIAGNOSIS — F41.9 ANXIETY DISORDER, UNSPECIFIED TYPE: ICD-10-CM

## 2023-12-11 DIAGNOSIS — F43.10 POSTTRAUMATIC STRESS DISORDER: Primary | ICD-10-CM

## 2023-12-11 PROCEDURE — 99214 OFFICE O/P EST MOD 30 MIN: CPT | Mod: VID | Performed by: PSYCHIATRY & NEUROLOGY

## 2023-12-11 ASSESSMENT — PAIN SCALES - GENERAL: PAINLEVEL: MODERATE PAIN (4)

## 2023-12-11 ASSESSMENT — PATIENT HEALTH QUESTIONNAIRE - PHQ9
SUM OF ALL RESPONSES TO PHQ QUESTIONS 1-9: 12
SUM OF ALL RESPONSES TO PHQ QUESTIONS 1-9: 12

## 2023-12-11 NOTE — NURSING NOTE
Is the patient currently in the state of [place]? YES    Visit mode:VIDEO    If the visit is dropped, the patient can be reconnected by: VIDEO VISIT: Text to cell phone:   Telephone Information:   Mobile [phone]       Will anyone else be joining the visit? NO  (If patient encounters technical issues they should call [phone]    How would you like to obtain your AVS? MyChart    Are changes needed to the allergy or medication list? No    Reason for visit: RECHECK    Nancy MIRAMONTES

## 2023-12-11 NOTE — Clinical Note
Leo Trimble, saw Adan for brief care. Unfortunately we didn't really make a lot of progress on his symptoms. I did try to really plant the seeds for evidence based treatment with SSRIs. He has the common backwards perception that if you need to take a med daily for it to work, you are dependent on it. I tried to undo some of this stigma.   Also emphasized that my primary recommendation is psychotherapy (as well as the fact that psychotherapy is more effective with a med), and he was receptive to taking the psychologytoday.com website to try to find a therapist.   Thanks again for the referral and let me know if there are any questions!

## 2023-12-11 NOTE — PATIENT INSTRUCTIONS
For therapy referrals, go to the website   [email or web address]    For medications, long acting options are the best option. There is no risk of dependence with them. They do need to be taken every day, but this is just so that they can work effectively, not because you need to take them every day.         **For crisis resources, please see the information at the end of this document**   Patient Education    Thank you for coming to the [facility] M[facility] HE[facility]DI[facility].     Lab Testing:  If you had lab testing today and your results are reassuring or normal they will be mailed to you or sent through [facility] within 7 days. If the lab tests need quick action we will call you with the results. The phone number we will call with results is # [phone]. If this is not the best number please call our clinic and change the number.     Medication Refills:  If you need any refills please call your pharmacy and they will contact us. Our fax number for refills is [phone].   Three business days of notice are needed for general medication refill requests.   Five business days of notice are needed for controlled substance refill requests.   If you need to change to a different pharmacy, please contact the new pharmacy directly. The new pharmacy will help you get your medications transferred.     Contact Us:  Please call [phone] during business hours (8-5:00 M-F).   If you have medication related questions after clinic hours, or on the weekend, please call [phone].     Financial Assistance [phone]   Medical Records [phone]       MENTAL HEALTH CRISIS RESOURCES:  For a emergency help, please call 911 or go to the nearest Emergency Department.     Emergency Walk-In Options:   [facility] Unit @ [facility] ([place]): [phone] - Specialized mental health emergency area designed to be calming  [facility] ([place]): [phone]  [facility]  ([place]): [phone]  [facility] ([place]): [phone]    [facility] Crisis Information:   [place]: [phone]  [place]: [phone]  [place] ([place]) - Adult: [phone]     Child: [phone]  [place] - Adult: [phone]     Child: [phone]  [place]: [phone]  List of all [facility] resources:   [email or web address]-contacts.[email or web address]    National Crisis Information:   Crisis Text Line: Text  [place]  to 398622  Suicide & Crisis Lifeline: 988  National Suicide Prevention Lifeline: 2-292-301-TALK (1-805.482.7316)       For online chat options, visit https://suicidepreventionlifeline.org/chat/  Poison Control Center: 1-628.358.4269  Trans Lifeline: 1-196.769.9404 - Hotline for transgender people of all ages  The [employer] Project: 5-881-808-7505 - Hotline for LGBT youth     For Non-Emergency Support:   Fast Tracker: Mental Health & Substance Use Disorder Resources -   https://www.[place].org/
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
## 2024-02-06 ENCOUNTER — TELEPHONE (OUTPATIENT)
Dept: FAMILY MEDICINE | Facility: CLINIC | Age: 49
End: 2024-02-06
Payer: COMMERCIAL

## 2024-07-29 ENCOUNTER — MYC MEDICAL ADVICE (OUTPATIENT)
Dept: CARE COORDINATION | Facility: CLINIC | Age: 49
End: 2024-07-29
Payer: COMMERCIAL

## 2025-08-02 ENCOUNTER — HEALTH MAINTENANCE LETTER (OUTPATIENT)
Age: 50
End: 2025-08-02